# Patient Record
Sex: MALE | Race: OTHER | NOT HISPANIC OR LATINO
[De-identification: names, ages, dates, MRNs, and addresses within clinical notes are randomized per-mention and may not be internally consistent; named-entity substitution may affect disease eponyms.]

---

## 2018-01-05 ENCOUNTER — APPOINTMENT (OUTPATIENT)
Dept: CARDIOLOGY | Facility: CLINIC | Age: 78
End: 2018-01-05

## 2018-01-31 ENCOUNTER — RECORD ABSTRACTING (OUTPATIENT)
Age: 78
End: 2018-01-31

## 2018-01-31 DIAGNOSIS — K21.9 GASTRO-ESOPHAGEAL REFLUX DISEASE W/OUT ESOPHAGITIS: ICD-10-CM

## 2018-01-31 DIAGNOSIS — Z98.890 OTHER SPECIFIED POSTPROCEDURAL STATES: ICD-10-CM

## 2018-01-31 DIAGNOSIS — Z86.73 PERSONAL HISTORY OF TRANSIENT ISCHEMIC ATTACK (TIA), AND CEREBRAL INFARCTION W/OUT RESIDUAL DEFICITS: ICD-10-CM

## 2018-01-31 DIAGNOSIS — Z78.9 OTHER SPECIFIED HEALTH STATUS: ICD-10-CM

## 2018-03-07 ENCOUNTER — APPOINTMENT (OUTPATIENT)
Dept: CARDIOLOGY | Facility: CLINIC | Age: 78
End: 2018-03-07
Payer: MEDICARE

## 2018-03-07 VITALS
SYSTOLIC BLOOD PRESSURE: 142 MMHG | HEART RATE: 64 BPM | RESPIRATION RATE: 14 BRPM | BODY MASS INDEX: 25.77 KG/M2 | WEIGHT: 180 LBS | HEIGHT: 70 IN | DIASTOLIC BLOOD PRESSURE: 70 MMHG

## 2018-03-07 PROCEDURE — 99214 OFFICE O/P EST MOD 30 MIN: CPT

## 2018-03-07 PROCEDURE — 93000 ELECTROCARDIOGRAM COMPLETE: CPT

## 2018-03-07 RX ORDER — RANITIDINE 150 MG/1
150 TABLET ORAL DAILY
Refills: 0 | Status: DISCONTINUED | COMMUNITY
End: 2018-03-07

## 2018-03-09 LAB — INR PPP: 4 RATIO

## 2018-03-20 ENCOUNTER — RESULT CHARGE (OUTPATIENT)
Age: 78
End: 2018-03-20

## 2018-03-23 ENCOUNTER — APPOINTMENT (OUTPATIENT)
Dept: CARDIOLOGY | Facility: CLINIC | Age: 78
End: 2018-03-23
Payer: MEDICARE

## 2018-03-23 VITALS — SYSTOLIC BLOOD PRESSURE: 128 MMHG | RESPIRATION RATE: 16 BRPM | DIASTOLIC BLOOD PRESSURE: 78 MMHG | HEART RATE: 57 BPM

## 2018-03-23 LAB — INR PPP: 2.1 RATIO

## 2018-03-23 PROCEDURE — 85610 PROTHROMBIN TIME: CPT | Mod: QW

## 2018-03-23 PROCEDURE — 99211 OFF/OP EST MAY X REQ PHY/QHP: CPT

## 2018-03-28 ENCOUNTER — APPOINTMENT (OUTPATIENT)
Dept: CARDIOLOGY | Facility: CLINIC | Age: 78
End: 2018-03-28

## 2018-04-04 ENCOUNTER — APPOINTMENT (OUTPATIENT)
Dept: CARDIOLOGY | Facility: CLINIC | Age: 78
End: 2018-04-04

## 2018-04-05 ENCOUNTER — APPOINTMENT (OUTPATIENT)
Dept: CARDIOLOGY | Facility: CLINIC | Age: 78
End: 2018-04-05
Payer: MEDICARE

## 2018-04-05 VITALS — HEIGHT: 70 IN | WEIGHT: 182.5 LBS | BODY MASS INDEX: 26.13 KG/M2

## 2018-04-05 VITALS
SYSTOLIC BLOOD PRESSURE: 162 MMHG | OXYGEN SATURATION: 99 % | DIASTOLIC BLOOD PRESSURE: 78 MMHG | HEART RATE: 92 BPM | RESPIRATION RATE: 16 BRPM

## 2018-04-05 PROCEDURE — 99213 OFFICE O/P EST LOW 20 MIN: CPT

## 2018-04-05 RX ORDER — AMOXICILLIN AND CLAVULANATE POTASSIUM 875; 125 MG/1; MG/1
875-125 TABLET, COATED ORAL
Qty: 20 | Refills: 0 | Status: COMPLETED | COMMUNITY
Start: 2017-10-29

## 2018-04-14 DIAGNOSIS — D64.9 ANEMIA, UNSPECIFIED: ICD-10-CM

## 2018-04-14 DIAGNOSIS — N25.0 RENAL OSTEODYSTROPHY: ICD-10-CM

## 2018-04-23 LAB — INR PPP: 2.9 RATIO

## 2018-05-24 ENCOUNTER — APPOINTMENT (OUTPATIENT)
Dept: CARDIOLOGY | Facility: CLINIC | Age: 78
End: 2018-05-24
Payer: MEDICARE

## 2018-05-24 VITALS
RESPIRATION RATE: 14 BRPM | BODY MASS INDEX: 26.48 KG/M2 | DIASTOLIC BLOOD PRESSURE: 80 MMHG | SYSTOLIC BLOOD PRESSURE: 148 MMHG | HEART RATE: 82 BPM | HEIGHT: 70 IN | WEIGHT: 185 LBS

## 2018-05-24 DIAGNOSIS — E11.9 TYPE 2 DIABETES MELLITUS W/OUT COMPLICATIONS: ICD-10-CM

## 2018-05-24 LAB — INR PPP: 4.6 RATIO

## 2018-05-24 PROCEDURE — 93000 ELECTROCARDIOGRAM COMPLETE: CPT

## 2018-05-24 PROCEDURE — 99214 OFFICE O/P EST MOD 30 MIN: CPT

## 2018-05-24 RX ORDER — GLIPIZIDE 10 MG/1
10 TABLET, EXTENDED RELEASE ORAL
Qty: 180 | Refills: 0 | Status: DISCONTINUED | COMMUNITY
Start: 2018-02-26

## 2018-05-24 RX ORDER — ERGOCALCIFEROL 1.25 MG/1
1.25 MG CAPSULE, LIQUID FILLED ORAL
Qty: 26 | Refills: 0 | Status: DISCONTINUED | COMMUNITY
Start: 2018-02-26

## 2018-05-24 RX ORDER — MAGNESIUM OXIDE 241.3 MG/1000MG
400 TABLET ORAL
Qty: 120 | Refills: 0 | Status: DISCONTINUED | COMMUNITY
Start: 2017-06-21

## 2018-05-24 RX ORDER — POTASSIUM CITRATE 10 MEQ/1
10 MEQ TABLET, EXTENDED RELEASE ORAL
Qty: 270 | Refills: 0 | Status: DISCONTINUED | COMMUNITY
Start: 2017-12-22 | End: 2018-05-24

## 2018-07-09 ENCOUNTER — APPOINTMENT (OUTPATIENT)
Dept: CARDIOLOGY | Facility: CLINIC | Age: 78
End: 2018-07-09
Payer: MEDICARE

## 2018-07-09 VITALS
SYSTOLIC BLOOD PRESSURE: 136 MMHG | RESPIRATION RATE: 14 BRPM | HEART RATE: 89 BPM | DIASTOLIC BLOOD PRESSURE: 80 MMHG | HEIGHT: 70 IN | BODY MASS INDEX: 26.34 KG/M2 | WEIGHT: 184 LBS

## 2018-07-09 PROCEDURE — 93000 ELECTROCARDIOGRAM COMPLETE: CPT

## 2018-07-09 PROCEDURE — 85610 PROTHROMBIN TIME: CPT | Mod: QW

## 2018-07-09 PROCEDURE — 99215 OFFICE O/P EST HI 40 MIN: CPT

## 2018-07-09 RX ORDER — PIOGLITAZONE HYDROCHLORIDE 15 MG/1
15 TABLET ORAL
Qty: 30 | Refills: 0 | Status: DISCONTINUED | COMMUNITY
Start: 2018-04-24 | End: 2018-07-09

## 2018-07-09 RX ORDER — MOMETASONE 50 UG/1
50 SPRAY, METERED NASAL
Qty: 17 | Refills: 0 | Status: DISCONTINUED | COMMUNITY
Start: 2017-10-29 | End: 2018-07-09

## 2018-07-09 RX ORDER — POTASSIUM CHLORIDE 750 MG/1
10 TABLET, FILM COATED, EXTENDED RELEASE ORAL DAILY
Refills: 0 | Status: DISCONTINUED | COMMUNITY
Start: 2018-06-29 | End: 2018-07-09

## 2018-07-11 ENCOUNTER — RESULT CHARGE (OUTPATIENT)
Age: 78
End: 2018-07-11

## 2018-07-12 ENCOUNTER — APPOINTMENT (OUTPATIENT)
Dept: CARDIOLOGY | Facility: CLINIC | Age: 78
End: 2018-07-12
Payer: MEDICARE

## 2018-07-12 VITALS — DIASTOLIC BLOOD PRESSURE: 80 MMHG | SYSTOLIC BLOOD PRESSURE: 130 MMHG | RESPIRATION RATE: 12 BRPM | HEART RATE: 82 BPM

## 2018-07-12 PROCEDURE — 85610 PROTHROMBIN TIME: CPT | Mod: QW

## 2018-07-12 PROCEDURE — 99211 OFF/OP EST MAY X REQ PHY/QHP: CPT

## 2018-07-13 ENCOUNTER — RESULT REVIEW (OUTPATIENT)
Age: 78
End: 2018-07-13

## 2018-07-13 LAB
INR PPP: 3.6 RATIO
INR PPP: 6.9 RATIO

## 2018-07-16 ENCOUNTER — APPOINTMENT (OUTPATIENT)
Dept: CARDIOLOGY | Facility: CLINIC | Age: 78
End: 2018-07-16
Payer: MEDICARE

## 2018-07-16 VITALS — HEART RATE: 80 BPM | RESPIRATION RATE: 14 BRPM | DIASTOLIC BLOOD PRESSURE: 80 MMHG | SYSTOLIC BLOOD PRESSURE: 142 MMHG

## 2018-07-16 PROCEDURE — 99211 OFF/OP EST MAY X REQ PHY/QHP: CPT

## 2018-07-16 PROCEDURE — 85610 PROTHROMBIN TIME: CPT | Mod: QW

## 2018-07-24 LAB — INR PPP: 3.3 RATIO

## 2018-07-25 ENCOUNTER — APPOINTMENT (OUTPATIENT)
Dept: CARDIOLOGY | Facility: CLINIC | Age: 78
End: 2018-07-25
Payer: MEDICARE

## 2018-07-25 VITALS — SYSTOLIC BLOOD PRESSURE: 118 MMHG | RESPIRATION RATE: 16 BRPM | HEART RATE: 76 BPM | DIASTOLIC BLOOD PRESSURE: 70 MMHG

## 2018-07-25 PROCEDURE — 85610 PROTHROMBIN TIME: CPT | Mod: QW

## 2018-07-25 PROCEDURE — 99211 OFF/OP EST MAY X REQ PHY/QHP: CPT

## 2018-07-26 LAB — INR PPP: 2.8 RATIO

## 2018-08-08 ENCOUNTER — APPOINTMENT (OUTPATIENT)
Dept: CARDIOLOGY | Facility: CLINIC | Age: 78
End: 2018-08-08
Payer: MEDICARE

## 2018-08-08 VITALS
OXYGEN SATURATION: 98 % | HEART RATE: 70 BPM | BODY MASS INDEX: 25.05 KG/M2 | WEIGHT: 175 LBS | HEIGHT: 70 IN | RESPIRATION RATE: 16 BRPM

## 2018-08-08 PROCEDURE — 85610 PROTHROMBIN TIME: CPT | Mod: QW

## 2018-08-08 PROCEDURE — 99211 OFF/OP EST MAY X REQ PHY/QHP: CPT

## 2018-08-09 LAB — INR PPP: 2.5 RATIO

## 2018-08-16 ENCOUNTER — APPOINTMENT (OUTPATIENT)
Dept: CARDIOLOGY | Facility: CLINIC | Age: 78
End: 2018-08-16
Payer: MEDICARE

## 2018-08-16 VITALS
BODY MASS INDEX: 24.62 KG/M2 | HEART RATE: 74 BPM | WEIGHT: 172 LBS | DIASTOLIC BLOOD PRESSURE: 76 MMHG | SYSTOLIC BLOOD PRESSURE: 158 MMHG | RESPIRATION RATE: 16 BRPM | HEIGHT: 70 IN

## 2018-08-16 PROCEDURE — 93000 ELECTROCARDIOGRAM COMPLETE: CPT

## 2018-08-16 PROCEDURE — 99214 OFFICE O/P EST MOD 30 MIN: CPT

## 2018-08-16 RX ORDER — EMPAGLIFLOZIN 10 MG/1
10 TABLET, FILM COATED ORAL
Qty: 90 | Refills: 0 | Status: DISCONTINUED | COMMUNITY
Start: 2017-09-28 | End: 2018-08-16

## 2018-09-05 ENCOUNTER — CHART COPY (OUTPATIENT)
Age: 78
End: 2018-09-05

## 2018-09-05 ENCOUNTER — APPOINTMENT (OUTPATIENT)
Dept: CARDIOLOGY | Facility: CLINIC | Age: 78
End: 2018-09-05
Payer: MEDICARE

## 2018-09-05 VITALS
DIASTOLIC BLOOD PRESSURE: 68 MMHG | OXYGEN SATURATION: 97 % | RESPIRATION RATE: 16 BRPM | HEART RATE: 73 BPM | SYSTOLIC BLOOD PRESSURE: 138 MMHG

## 2018-09-05 PROCEDURE — 85610 PROTHROMBIN TIME: CPT | Mod: QW

## 2018-09-05 PROCEDURE — 99211 OFF/OP EST MAY X REQ PHY/QHP: CPT

## 2018-09-10 LAB — INR PPP: 2.2 RATIO

## 2018-10-03 ENCOUNTER — APPOINTMENT (OUTPATIENT)
Dept: CARDIOLOGY | Facility: CLINIC | Age: 78
End: 2018-10-03
Payer: MEDICARE

## 2018-10-03 VITALS — RESPIRATION RATE: 14 BRPM | SYSTOLIC BLOOD PRESSURE: 130 MMHG | DIASTOLIC BLOOD PRESSURE: 80 MMHG | HEART RATE: 72 BPM

## 2018-10-03 PROCEDURE — 85610 PROTHROMBIN TIME: CPT | Mod: QW

## 2018-10-03 PROCEDURE — 99211 OFF/OP EST MAY X REQ PHY/QHP: CPT | Mod: 25

## 2018-10-17 ENCOUNTER — APPOINTMENT (OUTPATIENT)
Dept: CARDIOLOGY | Facility: CLINIC | Age: 78
End: 2018-10-17
Payer: MEDICARE

## 2018-10-17 VITALS — RESPIRATION RATE: 16 BRPM | SYSTOLIC BLOOD PRESSURE: 167 MMHG | DIASTOLIC BLOOD PRESSURE: 83 MMHG | HEART RATE: 86 BPM

## 2018-10-17 PROCEDURE — 85610 PROTHROMBIN TIME: CPT | Mod: QW

## 2018-10-17 PROCEDURE — 99211 OFF/OP EST MAY X REQ PHY/QHP: CPT

## 2018-10-19 LAB
INR PPP: 1.6 RATIO
INR PPP: 2.9 RATIO

## 2018-10-31 ENCOUNTER — APPOINTMENT (OUTPATIENT)
Dept: CARDIOLOGY | Facility: CLINIC | Age: 78
End: 2018-10-31
Payer: MEDICARE

## 2018-10-31 VITALS
HEART RATE: 82 BPM | WEIGHT: 172 LBS | HEIGHT: 70 IN | BODY MASS INDEX: 24.62 KG/M2 | OXYGEN SATURATION: 94 % | RESPIRATION RATE: 16 BRPM

## 2018-10-31 LAB — INR PPP: 2.1 RATIO

## 2018-10-31 PROCEDURE — 99211 OFF/OP EST MAY X REQ PHY/QHP: CPT | Mod: 25

## 2018-10-31 PROCEDURE — 85610 PROTHROMBIN TIME: CPT | Mod: QW

## 2018-11-08 ENCOUNTER — APPOINTMENT (OUTPATIENT)
Dept: CARDIOLOGY | Facility: CLINIC | Age: 78
End: 2018-11-08
Payer: MEDICARE

## 2018-11-08 VITALS
SYSTOLIC BLOOD PRESSURE: 142 MMHG | HEART RATE: 71 BPM | WEIGHT: 169 LBS | BODY MASS INDEX: 24.2 KG/M2 | DIASTOLIC BLOOD PRESSURE: 78 MMHG | HEIGHT: 70 IN | RESPIRATION RATE: 14 BRPM

## 2018-11-08 PROCEDURE — 93000 ELECTROCARDIOGRAM COMPLETE: CPT

## 2018-11-08 PROCEDURE — 99214 OFFICE O/P EST MOD 30 MIN: CPT

## 2018-11-08 RX ORDER — COLCHICINE 0.6 MG/1
0.6 TABLET ORAL DAILY
Refills: 0 | Status: DISCONTINUED | COMMUNITY
Start: 2018-02-18 | End: 2018-11-08

## 2018-11-08 NOTE — HISTORY OF PRESENT ILLNESS
[FreeTextEntry1] : Mr. Fischer presents today without complaints of exertional chest pain, shortness of breath, palpitations, lightheadedness, or syncope.  He remains reasonably active.  \par \par \par

## 2018-11-08 NOTE — REASON FOR VISIT
[FreeTextEntry1] : Mr. Fischer Patient presents today for follow up evaluation of his underlying hypertension, hyperlipidemia, non-obstructive coronary artery disease, and atrial fibrillation.  \par   \par

## 2018-11-08 NOTE — PHYSICAL EXAM
[General Appearance - Well Developed] : well developed [General Appearance - Well Nourished] : well nourished [General Appearance - In No Acute Distress] : no acute distress [Normal Conjunctiva] : the conjunctiva exhibited no abnormalities [Normal Oral Mucosa] : normal oral mucosa [Auscultation Breath Sounds / Voice Sounds] : lungs were clear to auscultation bilaterally [Heart Sounds] : normal S1 and S2 [Irregularly Irregular] : the rhythm was irregularly irregular [Systolic grade ___/6] : A grade [unfilled]/6 systolic murmur was heard. [Bowel Sounds] : normal bowel sounds [Abnormal Walk] : normal gait [Skin Color & Pigmentation] : normal skin color and pigmentation [Skin Turgor] : normal skin turgor [Oriented To Time, Place, And Person] : oriented to person, place, and time [Affect] : the affect was normal [Mood] : the mood was normal [FreeTextEntry1] : Chronic venous stasis, trace edema.

## 2018-11-08 NOTE — ASSESSMENT
[FreeTextEntry1] : 1.  EKG today reveals atrial fibrillation with a controlled ventricular response.  No ischemic changes. \par 2.  Hypertension:  Blood pressure borderline controlled at this time.  Patient is advised on a strict low-salt diet and weight loss. \par 3.  Hyperlipidemia:  Patient advised to follow a low-fat / low-cholesterol diet.  \par 4.  Non-obstructive coronary artery disease:  Patient clinically stable denying chest pain or shortness of breath.  \par 5.  Atrial fibrillation:  Patient currently in a-fib with a controlled ventricular response.  Tolerating Coumadin therapy well.  If clinically stable, follow up office visit three months. \par

## 2018-11-28 ENCOUNTER — APPOINTMENT (OUTPATIENT)
Dept: CARDIOLOGY | Facility: CLINIC | Age: 78
End: 2018-11-28

## 2019-01-18 ENCOUNTER — APPOINTMENT (OUTPATIENT)
Dept: CARDIOLOGY | Facility: CLINIC | Age: 79
End: 2019-01-18
Payer: MEDICARE

## 2019-01-18 PROCEDURE — 93306 TTE W/DOPPLER COMPLETE: CPT

## 2019-02-18 ENCOUNTER — APPOINTMENT (OUTPATIENT)
Dept: CARDIOLOGY | Facility: CLINIC | Age: 79
End: 2019-02-18
Payer: MEDICARE

## 2019-02-18 ENCOUNTER — NON-APPOINTMENT (OUTPATIENT)
Age: 79
End: 2019-02-18

## 2019-02-18 VITALS
RESPIRATION RATE: 14 BRPM | BODY MASS INDEX: 25.77 KG/M2 | SYSTOLIC BLOOD PRESSURE: 128 MMHG | HEART RATE: 84 BPM | DIASTOLIC BLOOD PRESSURE: 80 MMHG | HEIGHT: 70 IN | WEIGHT: 180 LBS

## 2019-02-18 PROCEDURE — 99214 OFFICE O/P EST MOD 30 MIN: CPT

## 2019-02-18 PROCEDURE — 93000 ELECTROCARDIOGRAM COMPLETE: CPT

## 2019-02-19 NOTE — REASON FOR VISIT
[FreeTextEntry1] : Mr. Fischer presents today for the evaluation and management of hypertension, hyperlipidemia, non-obstructive coronary artery disease and chronic atrial fibrillation.

## 2019-02-19 NOTE — HISTORY OF PRESENT ILLNESS
[FreeTextEntry1] : Patient presents today without complaints of exertional chest pain, shortness of breath, palpitations, lightheadedness, or syncope.  He is in considerable pain involving an arthritic left knee.  Patient was recently taken off Meloxicam because of “kidney function concerns.”

## 2019-02-19 NOTE — ASSESSMENT
[FreeTextEntry1] : 1.  EKG today demonstrates atrial fibrillation with a controlled ventricular response.  No acute ischemic changes. \par 2.  Chronic atrial fibrillation:  Patient remains in a-fib with a controlled ventricular response.  INR today 5.0.  Patient advised to hold Coumadin for the next three days and then resume Coumadin at 6 mg daily.  PT/INR in 10-14 days.  \par 3.  Hypertension:  Blood pressure appears well controlled at this time.  Will discontinue Spironolactone in an attempt to improve renal function.  Patient will undergo bloodwork prior to his follow up.  \par 4.  Hyperlipidemia:  Patient tolerating Lipitor therapy.  Will undergo fasting lipid profile prior to his next office visit.  \par

## 2019-03-15 ENCOUNTER — APPOINTMENT (OUTPATIENT)
Dept: CARDIOLOGY | Facility: CLINIC | Age: 79
End: 2019-03-15
Payer: MEDICARE

## 2019-03-15 ENCOUNTER — NON-APPOINTMENT (OUTPATIENT)
Age: 79
End: 2019-03-15

## 2019-03-15 VITALS
SYSTOLIC BLOOD PRESSURE: 114 MMHG | HEART RATE: 72 BPM | RESPIRATION RATE: 14 BRPM | WEIGHT: 195 LBS | BODY MASS INDEX: 27.92 KG/M2 | DIASTOLIC BLOOD PRESSURE: 80 MMHG | HEIGHT: 70 IN

## 2019-03-15 DIAGNOSIS — N18.3 CHRONIC KIDNEY DISEASE, STAGE 3 (MODERATE): ICD-10-CM

## 2019-03-15 PROCEDURE — 93000 ELECTROCARDIOGRAM COMPLETE: CPT

## 2019-03-15 PROCEDURE — 99214 OFFICE O/P EST MOD 30 MIN: CPT

## 2019-03-15 RX ORDER — PIOGLITAZONE HYDROCHLORIDE 15 MG/1
15 TABLET ORAL DAILY
Refills: 0 | Status: DISCONTINUED | COMMUNITY
End: 2019-03-15

## 2019-03-15 RX ORDER — ESOMEPRAZOLE MAGNESIUM 40 MG/1
40 CAPSULE, DELAYED RELEASE ORAL DAILY
Refills: 0 | Status: DISCONTINUED | COMMUNITY
End: 2019-03-15

## 2019-03-15 RX ORDER — MELOXICAM 7.5 MG/1
7.5 TABLET ORAL DAILY
Refills: 0 | Status: DISCONTINUED | COMMUNITY
End: 2019-03-15

## 2019-03-15 RX ORDER — AMLODIPINE BESYLATE 5 MG/1
5 TABLET ORAL DAILY
Qty: 90 | Refills: 3 | Status: ACTIVE | COMMUNITY
Start: 2019-03-15 | End: 1900-01-01

## 2019-03-15 NOTE — REVIEW OF SYSTEMS
[see HPI] : see HPI [Negative] : Heme/Lymph [Recent Weight Gain (___ Lbs)] : recent [unfilled] ~Ulb weight gain [Lower Ext Edema] : lower extremity edema

## 2019-03-19 ENCOUNTER — RX RENEWAL (OUTPATIENT)
Age: 79
End: 2019-03-19

## 2019-03-19 ENCOUNTER — MEDICATION RENEWAL (OUTPATIENT)
Age: 79
End: 2019-03-19

## 2019-03-27 ENCOUNTER — APPOINTMENT (OUTPATIENT)
Dept: CARDIOLOGY | Facility: CLINIC | Age: 79
End: 2019-03-27
Payer: MEDICARE

## 2019-03-27 VITALS — RESPIRATION RATE: 16 BRPM | SYSTOLIC BLOOD PRESSURE: 155 MMHG | HEART RATE: 77 BPM | DIASTOLIC BLOOD PRESSURE: 77 MMHG

## 2019-03-27 PROCEDURE — 93793 ANTICOAG MGMT PT WARFARIN: CPT

## 2019-03-28 LAB — INR PPP: 3.3 RATIO

## 2019-04-08 NOTE — DISCUSSION/SUMMARY
[FreeTextEntry1] : Case and plan discussed with Dr. Medina.\par \par 1 - Hypertension: Blood pressure well controlled on current medications.  Advised to follow strict low salt diet and weight loss.\par \par 2 - Chronic atrial fibrillation: today's EKG reveals atrial fibrillation with a controlled ventricular response.  Denies shortness of breath or palpitations.  INR from 3/14/19 was 3.3.  Advised Mr. Fischer to hold his coumadin dose today and resume his 6mg daily tomorrow.  INR check in 2 weeks.\par \par 3 - Peripheral edema:  patient with increased edema.  Up 15 pounds from his last visit 1 month ago.  He is very confused as to the medications he should be taking.  His daughter will confirm once she gets home.  At this time we will have him continue furosemide 40mg daily.  Will not increase as his BUN and creatinine are elevated.  BUN 61, creatinine 2.5. potassium 4.5 (not on K+ supplementation).  Have advised Mr. Fischer to return to Dr. Wilks for assessment of his kidney function, who he says he has not seen in a very long time.   Diovan will be discontinued.  Will start amlodipine 5mg daily.\par \par 4 -Hyperlipidemia:  low fat, low cholesterol, low carbohydrated diet.\par \par 5 - Labs prior to follow up visit.\par \par 6 - Follow up in 4-6 weeks.

## 2019-04-08 NOTE — HISTORY OF PRESENT ILLNESS
[FreeTextEntry1] : Mr. Fischer presents today for follow up evaluation.  Presently feeling well.  Denies complaints of chest pain, shortness of breath, palpitations, lightheadednes or syncope.  States he is being compliant with his low salt diet.  His daughter accompanied him today and says that she does all the cooking for him.  He appears to be very confused with his medication list.  The daughter will check his meds when they get home and will contact me to clarify.  He will be needing knee replacement surgery in the near future.

## 2019-04-08 NOTE — PHYSICAL EXAM
[General Appearance - Well Developed] : well developed [General Appearance - Well Nourished] : well nourished [General Appearance - In No Acute Distress] : no acute distress [Normal Conjunctiva] : the conjunctiva exhibited no abnormalities [Normal Oral Mucosa] : normal oral mucosa [] : no respiratory distress [Auscultation Breath Sounds / Voice Sounds] : lungs were clear to auscultation bilaterally [Heart Sounds] : normal S1 and S2 [Bowel Sounds] : normal bowel sounds [Abnormal Walk] : normal gait [Skin Color & Pigmentation] : normal skin color and pigmentation [Skin Turgor] : normal skin turgor [Oriented To Time, Place, And Person] : oriented to person, place, and time [Affect] : the affect was normal [Mood] : the mood was normal [FreeTextEntry1] : 1-2+ bilateral LE edema

## 2019-04-08 NOTE — REASON FOR VISIT
[FreeTextEntry1] : The patient is a 78 y.o. white male who presents today for the evaluation and management of hypertension, hyperlipidemia, non-obstructive coronary artery disease and chronic atrial fibrillation.

## 2019-04-10 ENCOUNTER — APPOINTMENT (OUTPATIENT)
Dept: CARDIOLOGY | Facility: CLINIC | Age: 79
End: 2019-04-10

## 2019-04-10 LAB — INR PPP: 3.3

## 2019-04-11 ENCOUNTER — NON-APPOINTMENT (OUTPATIENT)
Age: 79
End: 2019-04-11

## 2019-04-11 ENCOUNTER — APPOINTMENT (OUTPATIENT)
Dept: CARDIOLOGY | Facility: CLINIC | Age: 79
End: 2019-04-11
Payer: MEDICARE

## 2019-04-11 VITALS
HEIGHT: 70 IN | DIASTOLIC BLOOD PRESSURE: 79 MMHG | WEIGHT: 188 LBS | BODY MASS INDEX: 26.92 KG/M2 | HEART RATE: 105 BPM | RESPIRATION RATE: 14 BRPM | SYSTOLIC BLOOD PRESSURE: 139 MMHG

## 2019-04-11 DIAGNOSIS — I25.10 ATHEROSCLEROTIC HEART DISEASE OF NATIVE CORONARY ARTERY W/OUT ANGINA PECTORIS: ICD-10-CM

## 2019-04-11 DIAGNOSIS — E78.5 HYPERLIPIDEMIA, UNSPECIFIED: ICD-10-CM

## 2019-04-11 DIAGNOSIS — R60.9 EDEMA, UNSPECIFIED: ICD-10-CM

## 2019-04-11 DIAGNOSIS — I48.0 PAROXYSMAL ATRIAL FIBRILLATION: ICD-10-CM

## 2019-04-11 DIAGNOSIS — I10 ESSENTIAL (PRIMARY) HYPERTENSION: ICD-10-CM

## 2019-04-11 PROCEDURE — 99214 OFFICE O/P EST MOD 30 MIN: CPT

## 2019-04-11 PROCEDURE — 93000 ELECTROCARDIOGRAM COMPLETE: CPT

## 2019-04-11 NOTE — DISCUSSION/SUMMARY
[FreeTextEntry1] : Dr. Medina in to speak with and assess the patient.\par \par 1 - Hypertension:  blood pressure well controlled on current medications.  Advised to follow low salt diet.\par \par 2 - Chronic atrial fibrillation:  today's EKG reveals atrial fibrillation with a rapid ventricular response.  Denies shortness of breath or palpitaitons.  Next INR due in 1 week.\par \par 3 - Peripheral edema:  patient with "throbbing" pain in left knee.  Unable to put pressure on leg for walking.  Advised patient and daughter to go to [facility] ER for evaluation, and to contact his orthopedist.\par \par 4 - Labs:  BUN 53, creatinine 2.7.\par \par 5 - Patient has follow up on Monday.

## 2019-04-11 NOTE — PHYSICAL EXAM
[General Appearance - Well Developed] : well developed [General Appearance - Well Nourished] : well nourished [General Appearance - In No Acute Distress] : no acute distress [Normal Conjunctiva] : the conjunctiva exhibited no abnormalities [Normal Oral Mucosa] : normal oral mucosa [] : no respiratory distress [Auscultation Breath Sounds / Voice Sounds] : lungs were clear to auscultation bilaterally [Heart Sounds] : normal S1 and S2 [Bowel Sounds] : normal bowel sounds [FreeTextEntry1] : 1-2+ bilateral ankle/feet edema.  Swollen and warm left knee. [Skin Color & Pigmentation] : normal skin color and pigmentation [Skin Turgor] : normal skin turgor [Oriented To Time, Place, And Person] : oriented to person, place, and time [Affect] : the affect was normal [Mood] : the mood was normal

## 2019-04-11 NOTE — REASON FOR VISIT
[FreeTextEntry1] : The patient is a 79y.o. white male who presents today for the evaluation and management of hypertension, hyperlipidemia, non-obstructive coronary artery disease and chronic atrial fibrillation.

## 2019-04-11 NOTE — DISCUSSION/SUMMARY
[FreeTextEntry1] : Dr. Medina in to speak with and assess the patient.\par \par 1 - Hypertension:  blood pressure well controlled on current medications.  Advised to follow low salt diet.\par \par 2 - Chronic atrial fibrillation:  today's EKG reveals atrial fibrillation with a rapid ventricular response.  Denies shortness of breath or palpitaitons.  Next INR due in 1 week.\par \par 3 - Peripheral edema:  patient with "throbbing" pain in left knee.  Unable to put pressure on leg for walking.  Advised patient and daughter to go to [facility] ER for evaluation, and to contact his orthopedist.\par \par 4 - Labs:  BUN 53, creatinine 2.7.\par \par 5 - Patient has follow up on Monday.

## 2019-04-11 NOTE — HISTORY OF PRESENT ILLNESS
[FreeTextEntry1] : Mr. Fischer presents today for follow up evaluation.  Presently he is without complaints of chest pain, shortness of breath, palpitations, lightheadedness or syncope.  His major complaint today is that of his left leg, mainly his knee and ankle/foot pain and swelling.  He is unable to walk on it as the pain is throbbing.

## 2019-04-15 ENCOUNTER — APPOINTMENT (OUTPATIENT)
Dept: CARDIOLOGY | Facility: CLINIC | Age: 79
End: 2019-04-15

## 2019-05-07 ENCOUNTER — RESULT REVIEW (OUTPATIENT)
Age: 79
End: 2019-05-07

## 2019-05-07 LAB — INR PPP: 6.3

## 2019-06-12 ENCOUNTER — INPATIENT (INPATIENT)
Facility: HOSPITAL | Age: 79
LOS: 7 days | Discharge: NURSING, REHAB OR LONG-TERM CARE | DRG: 871 | End: 2019-06-20
Attending: HOSPITALIST | Admitting: HOSPITALIST
Payer: MEDICARE

## 2019-06-12 VITALS
HEART RATE: 86 BPM | SYSTOLIC BLOOD PRESSURE: 170 MMHG | WEIGHT: 179.9 LBS | DIASTOLIC BLOOD PRESSURE: 79 MMHG | RESPIRATION RATE: 28 BRPM | TEMPERATURE: 101 F | OXYGEN SATURATION: 99 % | HEIGHT: 71 IN

## 2019-06-12 DIAGNOSIS — M1A.20X0 DRUG-INDUCED CHRONIC GOUT, UNSPECIFIED SITE, WITHOUT TOPHUS (TOPHI): ICD-10-CM

## 2019-06-12 DIAGNOSIS — N18.3 CHRONIC KIDNEY DISEASE, STAGE 3 (MODERATE): ICD-10-CM

## 2019-06-12 DIAGNOSIS — E11.8 TYPE 2 DIABETES MELLITUS WITH UNSPECIFIED COMPLICATIONS: ICD-10-CM

## 2019-06-12 DIAGNOSIS — I48.0 PAROXYSMAL ATRIAL FIBRILLATION: ICD-10-CM

## 2019-06-12 DIAGNOSIS — I50.9 HEART FAILURE, UNSPECIFIED: ICD-10-CM

## 2019-06-12 DIAGNOSIS — I10 ESSENTIAL (PRIMARY) HYPERTENSION: ICD-10-CM

## 2019-06-12 DIAGNOSIS — J18.9 PNEUMONIA, UNSPECIFIED ORGANISM: ICD-10-CM

## 2019-06-12 DIAGNOSIS — A41.9 SEPSIS, UNSPECIFIED ORGANISM: ICD-10-CM

## 2019-06-12 LAB
ALBUMIN SERPL ELPH-MCNC: 2.9 G/DL — LOW (ref 3.3–5.2)
ALP SERPL-CCNC: 132 U/L — HIGH (ref 40–120)
ALT FLD-CCNC: 33 U/L — SIGNIFICANT CHANGE UP
ANION GAP SERPL CALC-SCNC: 13 MMOL/L — SIGNIFICANT CHANGE UP (ref 5–17)
ANISOCYTOSIS BLD QL: SLIGHT — SIGNIFICANT CHANGE UP
APPEARANCE UR: CLEAR — SIGNIFICANT CHANGE UP
APTT BLD: 39.5 SEC — HIGH (ref 27.5–36.3)
AST SERPL-CCNC: 32 U/L — SIGNIFICANT CHANGE UP
BACTERIA # UR AUTO: NEGATIVE — SIGNIFICANT CHANGE UP
BASOPHILS # BLD AUTO: 0 K/UL — SIGNIFICANT CHANGE UP (ref 0–0.2)
BILIRUB SERPL-MCNC: 0.5 MG/DL — SIGNIFICANT CHANGE UP (ref 0.4–2)
BILIRUB UR-MCNC: NEGATIVE — SIGNIFICANT CHANGE UP
BUN SERPL-MCNC: 42 MG/DL — HIGH (ref 8–20)
CALCIUM SERPL-MCNC: 9.4 MG/DL — SIGNIFICANT CHANGE UP (ref 8.6–10.2)
CHLORIDE SERPL-SCNC: 100 MMOL/L — SIGNIFICANT CHANGE UP (ref 98–107)
CO2 SERPL-SCNC: 23 MMOL/L — SIGNIFICANT CHANGE UP (ref 22–29)
COLOR SPEC: YELLOW — SIGNIFICANT CHANGE UP
CREAT SERPL-MCNC: 1.98 MG/DL — HIGH (ref 0.5–1.3)
DIFF PNL FLD: ABNORMAL
ENTEROCOC DNA BLD POS QL NAA+NON-PROBE: SIGNIFICANT CHANGE UP
EOSINOPHIL # BLD AUTO: 0 K/UL — SIGNIFICANT CHANGE UP (ref 0–0.5)
EPI CELLS # UR: SIGNIFICANT CHANGE UP
GLUCOSE BLDC GLUCOMTR-MCNC: 103 MG/DL — HIGH (ref 70–99)
GLUCOSE BLDC GLUCOMTR-MCNC: 193 MG/DL — HIGH (ref 70–99)
GLUCOSE SERPL-MCNC: 187 MG/DL — HIGH (ref 70–115)
GLUCOSE UR QL: NEGATIVE MG/DL — SIGNIFICANT CHANGE UP
HCT VFR BLD CALC: 36.7 % — LOW (ref 42–52)
HGB BLD-MCNC: 11.9 G/DL — LOW (ref 14–18)
INR BLD: 2.63 RATIO — HIGH (ref 0.88–1.16)
KETONES UR-MCNC: NEGATIVE — SIGNIFICANT CHANGE UP
LACTATE BLDV-MCNC: 2.2 MMOL/L — HIGH (ref 0.5–2)
LACTATE BLDV-MCNC: 2.3 MMOL/L — HIGH (ref 0.5–2)
LEUKOCYTE ESTERASE UR-ACNC: NEGATIVE — SIGNIFICANT CHANGE UP
LYMPHOCYTES # BLD AUTO: 0.8 K/UL — LOW (ref 1–4.8)
LYMPHOCYTES # BLD AUTO: 6 % — LOW (ref 20–55)
MACROCYTES BLD QL: SLIGHT — SIGNIFICANT CHANGE UP
MCHC RBC-ENTMCNC: 28.8 PG — SIGNIFICANT CHANGE UP (ref 27–31)
MCHC RBC-ENTMCNC: 32.4 G/DL — SIGNIFICANT CHANGE UP (ref 32–36)
MCV RBC AUTO: 88.9 FL — SIGNIFICANT CHANGE UP (ref 80–94)
METHOD TYPE: SIGNIFICANT CHANGE UP
MICROCYTES BLD QL: SLIGHT — SIGNIFICANT CHANGE UP
MONOCYTES # BLD AUTO: 1.4 K/UL — HIGH (ref 0–0.8)
MONOCYTES NFR BLD AUTO: 11 % — HIGH (ref 3–10)
NEUTROPHILS # BLD AUTO: 10.3 K/UL — HIGH (ref 1.8–8)
NEUTROPHILS NFR BLD AUTO: 81 % — HIGH (ref 37–73)
NEUTS BAND # BLD: 2 % — SIGNIFICANT CHANGE UP (ref 0–8)
NITRITE UR-MCNC: NEGATIVE — SIGNIFICANT CHANGE UP
PH UR: 6 — SIGNIFICANT CHANGE UP (ref 5–8)
PLAT MORPH BLD: NORMAL — SIGNIFICANT CHANGE UP
PLATELET # BLD AUTO: 187 K/UL — SIGNIFICANT CHANGE UP (ref 150–400)
POIKILOCYTOSIS BLD QL AUTO: SLIGHT — SIGNIFICANT CHANGE UP
POTASSIUM SERPL-MCNC: 4.5 MMOL/L — SIGNIFICANT CHANGE UP (ref 3.5–5.3)
POTASSIUM SERPL-SCNC: 4.5 MMOL/L — SIGNIFICANT CHANGE UP (ref 3.5–5.3)
PROT SERPL-MCNC: 7.1 G/DL — SIGNIFICANT CHANGE UP (ref 6.6–8.7)
PROT UR-MCNC: 30 MG/DL
PROTHROM AB SERPL-ACNC: 31.2 SEC — HIGH (ref 10–12.9)
RBC # BLD: 4.13 M/UL — LOW (ref 4.6–6.2)
RBC # FLD: 16.5 % — HIGH (ref 11–15.6)
RBC BLD AUTO: ABNORMAL
RBC CASTS # UR COMP ASSIST: SIGNIFICANT CHANGE UP /HPF (ref 0–4)
SODIUM SERPL-SCNC: 136 MMOL/L — SIGNIFICANT CHANGE UP (ref 135–145)
SP GR SPEC: 1.01 — SIGNIFICANT CHANGE UP (ref 1.01–1.02)
URATE SERPL-MCNC: 5.8 MG/DL — SIGNIFICANT CHANGE UP (ref 3.4–7)
UROBILINOGEN FLD QL: NEGATIVE MG/DL — SIGNIFICANT CHANGE UP
WBC # BLD: 12.6 K/UL — HIGH (ref 4.8–10.8)
WBC # FLD AUTO: 12.6 K/UL — HIGH (ref 4.8–10.8)

## 2019-06-12 PROCEDURE — 71250 CT THORAX DX C-: CPT | Mod: 26

## 2019-06-12 PROCEDURE — 71045 X-RAY EXAM CHEST 1 VIEW: CPT | Mod: 26

## 2019-06-12 PROCEDURE — 99223 1ST HOSP IP/OBS HIGH 75: CPT

## 2019-06-12 PROCEDURE — 99291 CRITICAL CARE FIRST HOUR: CPT

## 2019-06-12 PROCEDURE — 93010 ELECTROCARDIOGRAM REPORT: CPT

## 2019-06-12 RX ORDER — METOPROLOL TARTRATE 50 MG
200 TABLET ORAL DAILY
Refills: 0 | Status: DISCONTINUED | OUTPATIENT
Start: 2019-06-12 | End: 2019-06-20

## 2019-06-12 RX ORDER — ACETAMINOPHEN 500 MG
975 TABLET ORAL ONCE
Refills: 0 | Status: COMPLETED | OUTPATIENT
Start: 2019-06-12 | End: 2019-06-12

## 2019-06-12 RX ORDER — SODIUM CHLORIDE 9 MG/ML
1000 INJECTION, SOLUTION INTRAVENOUS
Refills: 0 | Status: DISCONTINUED | OUTPATIENT
Start: 2019-06-12 | End: 2019-06-20

## 2019-06-12 RX ORDER — PANTOPRAZOLE SODIUM 20 MG/1
40 TABLET, DELAYED RELEASE ORAL
Refills: 0 | Status: DISCONTINUED | OUTPATIENT
Start: 2019-06-12 | End: 2019-06-20

## 2019-06-12 RX ORDER — ASPIRIN/CALCIUM CARB/MAGNESIUM 324 MG
81 TABLET ORAL DAILY
Refills: 0 | Status: DISCONTINUED | OUTPATIENT
Start: 2019-06-12 | End: 2019-06-20

## 2019-06-12 RX ORDER — DEXTROSE 50 % IN WATER 50 %
25 SYRINGE (ML) INTRAVENOUS ONCE
Refills: 0 | Status: DISCONTINUED | OUTPATIENT
Start: 2019-06-12 | End: 2019-06-20

## 2019-06-12 RX ORDER — PIPERACILLIN AND TAZOBACTAM 4; .5 G/20ML; G/20ML
3.38 INJECTION, POWDER, LYOPHILIZED, FOR SOLUTION INTRAVENOUS ONCE
Refills: 0 | Status: COMPLETED | OUTPATIENT
Start: 2019-06-12 | End: 2019-06-12

## 2019-06-12 RX ORDER — VANCOMYCIN HCL 1 G
1000 VIAL (EA) INTRAVENOUS ONCE
Refills: 0 | Status: COMPLETED | OUTPATIENT
Start: 2019-06-12 | End: 2019-06-12

## 2019-06-12 RX ORDER — METOPROLOL TARTRATE 50 MG
1 TABLET ORAL
Qty: 0 | Refills: 0 | DISCHARGE

## 2019-06-12 RX ORDER — SITAGLIPTIN 50 MG/1
1 TABLET, FILM COATED ORAL
Qty: 0 | Refills: 0 | DISCHARGE

## 2019-06-12 RX ORDER — WARFARIN SODIUM 2.5 MG/1
6 TABLET ORAL ONCE
Refills: 0 | Status: COMPLETED | OUTPATIENT
Start: 2019-06-12 | End: 2019-06-12

## 2019-06-12 RX ORDER — RANITIDINE HYDROCHLORIDE 150 MG/1
2 TABLET, FILM COATED ORAL
Qty: 0 | Refills: 0 | DISCHARGE

## 2019-06-12 RX ORDER — GLUCAGON INJECTION, SOLUTION 0.5 MG/.1ML
1 INJECTION, SOLUTION SUBCUTANEOUS ONCE
Refills: 0 | Status: DISCONTINUED | OUTPATIENT
Start: 2019-06-12 | End: 2019-06-20

## 2019-06-12 RX ORDER — ATORVASTATIN CALCIUM 80 MG/1
1 TABLET, FILM COATED ORAL
Qty: 0 | Refills: 0 | DISCHARGE

## 2019-06-12 RX ORDER — VANCOMYCIN HCL 1 G
1000 VIAL (EA) INTRAVENOUS EVERY 24 HOURS
Refills: 0 | Status: DISCONTINUED | OUTPATIENT
Start: 2019-06-13 | End: 2019-06-14

## 2019-06-12 RX ORDER — VANCOMYCIN HCL 1 G
500 VIAL (EA) INTRAVENOUS EVERY 12 HOURS
Refills: 0 | Status: DISCONTINUED | OUTPATIENT
Start: 2019-06-12 | End: 2019-06-12

## 2019-06-12 RX ORDER — DEXTROSE 50 % IN WATER 50 %
12.5 SYRINGE (ML) INTRAVENOUS ONCE
Refills: 0 | Status: DISCONTINUED | OUTPATIENT
Start: 2019-06-12 | End: 2019-06-20

## 2019-06-12 RX ORDER — ASPIRIN/CALCIUM CARB/MAGNESIUM 324 MG
1 TABLET ORAL
Qty: 0 | Refills: 0 | DISCHARGE

## 2019-06-12 RX ORDER — DEXTROSE 50 % IN WATER 50 %
15 SYRINGE (ML) INTRAVENOUS ONCE
Refills: 0 | Status: DISCONTINUED | OUTPATIENT
Start: 2019-06-12 | End: 2019-06-20

## 2019-06-12 RX ORDER — ALLOPURINOL 300 MG
100 TABLET ORAL DAILY
Refills: 0 | Status: DISCONTINUED | OUTPATIENT
Start: 2019-06-12 | End: 2019-06-20

## 2019-06-12 RX ORDER — ATORVASTATIN CALCIUM 80 MG/1
40 TABLET, FILM COATED ORAL AT BEDTIME
Refills: 0 | Status: DISCONTINUED | OUTPATIENT
Start: 2019-06-12 | End: 2019-06-20

## 2019-06-12 RX ORDER — FUROSEMIDE 40 MG
40 TABLET ORAL
Refills: 0 | Status: DISCONTINUED | OUTPATIENT
Start: 2019-06-12 | End: 2019-06-13

## 2019-06-12 RX ORDER — INSULIN LISPRO 100/ML
VIAL (ML) SUBCUTANEOUS
Refills: 0 | Status: DISCONTINUED | OUTPATIENT
Start: 2019-06-12 | End: 2019-06-18

## 2019-06-12 RX ORDER — SODIUM CHLORIDE 9 MG/ML
2700 INJECTION INTRAMUSCULAR; INTRAVENOUS; SUBCUTANEOUS ONCE
Refills: 0 | Status: COMPLETED | OUTPATIENT
Start: 2019-06-12 | End: 2019-06-12

## 2019-06-12 RX ORDER — ACETAMINOPHEN 500 MG
650 TABLET ORAL ONCE
Refills: 0 | Status: COMPLETED | OUTPATIENT
Start: 2019-06-12 | End: 2019-06-12

## 2019-06-12 RX ORDER — PIPERACILLIN AND TAZOBACTAM 4; .5 G/20ML; G/20ML
3.38 INJECTION, POWDER, LYOPHILIZED, FOR SOLUTION INTRAVENOUS EVERY 8 HOURS
Refills: 0 | Status: DISCONTINUED | OUTPATIENT
Start: 2019-06-12 | End: 2019-06-14

## 2019-06-12 RX ORDER — PIPERACILLIN AND TAZOBACTAM 4; .5 G/20ML; G/20ML
3.38 INJECTION, POWDER, LYOPHILIZED, FOR SOLUTION INTRAVENOUS EVERY 12 HOURS
Refills: 0 | Status: DISCONTINUED | OUTPATIENT
Start: 2019-06-12 | End: 2019-06-12

## 2019-06-12 RX ADMIN — Medication 81 MILLIGRAM(S): at 17:43

## 2019-06-12 RX ADMIN — PIPERACILLIN AND TAZOBACTAM 3.38 GRAM(S): 4; .5 INJECTION, POWDER, LYOPHILIZED, FOR SOLUTION INTRAVENOUS at 09:50

## 2019-06-12 RX ADMIN — Medication 975 MILLIGRAM(S): at 10:15

## 2019-06-12 RX ADMIN — PIPERACILLIN AND TAZOBACTAM 25 GRAM(S): 4; .5 INJECTION, POWDER, LYOPHILIZED, FOR SOLUTION INTRAVENOUS at 17:43

## 2019-06-12 RX ADMIN — SODIUM CHLORIDE 2700 MILLILITER(S): 9 INJECTION INTRAMUSCULAR; INTRAVENOUS; SUBCUTANEOUS at 09:10

## 2019-06-12 RX ADMIN — SODIUM CHLORIDE 2700 MILLILITER(S): 9 INJECTION INTRAMUSCULAR; INTRAVENOUS; SUBCUTANEOUS at 12:09

## 2019-06-12 RX ADMIN — Medication 650 MILLIGRAM(S): at 21:23

## 2019-06-12 RX ADMIN — ATORVASTATIN CALCIUM 40 MILLIGRAM(S): 80 TABLET, FILM COATED ORAL at 22:06

## 2019-06-12 RX ADMIN — Medication 250 MILLIGRAM(S): at 10:37

## 2019-06-12 RX ADMIN — Medication 975 MILLIGRAM(S): at 09:20

## 2019-06-12 RX ADMIN — WARFARIN SODIUM 6 MILLIGRAM(S): 2.5 TABLET ORAL at 22:06

## 2019-06-12 RX ADMIN — Medication 40 MILLIGRAM(S): at 17:43

## 2019-06-12 RX ADMIN — PIPERACILLIN AND TAZOBACTAM 200 GRAM(S): 4; .5 INJECTION, POWDER, LYOPHILIZED, FOR SOLUTION INTRAVENOUS at 09:20

## 2019-06-12 RX ADMIN — Medication 100 MILLIGRAM(S): at 17:43

## 2019-06-12 RX ADMIN — Medication 1000 MILLIGRAM(S): at 11:37

## 2019-06-12 NOTE — ED PROVIDER NOTE - ADMIT DISPOSITION PRESENT ON ADMISSION SEPSIS Q1 - RE-EVALUATED PATIENT FLUID AND VITAL SIGNS
I have re-evaluated the patient's fluid status and reviewed vital signs. Clinical perfusion assessment was performed.

## 2019-06-12 NOTE — ED PROVIDER NOTE - CLINICAL SUMMARY MEDICAL DECISION MAKING FREE TEXT BOX
The patient presents with SOB, fever and cough and CXR c/w R sided pneumonia and most likely septic and will admit for further evaluation.

## 2019-06-12 NOTE — ED ADULT TRIAGE NOTE - CHIEF COMPLAINT QUOTE
Patient brought in by ambulance form [facility] for SOB 83%RA.  Placed on nonrebreather and given 40mg lasix.  Patient arrived to ED tachypneic/febrile, called Sepsis initiated.

## 2019-06-12 NOTE — ED PROVIDER NOTE - CARE PLAN
Principal Discharge DX:	Pneumonia  Secondary Diagnosis:	Hyperlipidemia  Secondary Diagnosis:	Essential hypertension  Secondary Diagnosis:	Valvular heart disease  Secondary Diagnosis:	Sepsis

## 2019-06-12 NOTE — ED ADULT NURSE NOTE - NSIMPLEMENTINTERV_GEN_ALL_ED
Implemented All Fall Risk Interventions:  [place] to call system. Call bell, personal items and telephone within reach. Instruct patient to call for assistance. Room bathroom lighting operational. Non-slip footwear when patient is off stretcher. Physically safe environment: no spills, clutter or unnecessary equipment. Stretcher in lowest position, wheels locked, appropriate side rails in place. Provide visual cue, wrist band, yellow gown, etc. Monitor gait and stability. Monitor for mental status changes and reorient to person, place, and time. Review medications for side effects contributing to fall risk. Reinforce activity limits and safety measures with patient and family.

## 2019-06-12 NOTE — PHYSICAL THERAPY INITIAL EVALUATION ADULT - ADDITIONAL COMMENTS
Pt lives in a 1 story house with granddaughter, 3 steps to enter.  Comes to us from a [facility], reports being unable to ambulate at [facility]. requires assist with all ADLs and self care.

## 2019-06-12 NOTE — ED PROVIDER NOTE - PMH
Atrial arrhythmia    Essential hypertension    Gastroesophageal reflux disease without esophagitis    Heart murmur    Hyperlipidemia    Iron deficiency anemia    Obesity    Transient cerebral ischemia, unspecified transient cerebral ischemia type    Valvular heart disease

## 2019-06-12 NOTE — ED ADULT NURSE NOTE - OBJECTIVE STATEMENT
79 year old male in no acute distress, alert and oriented x 4 BIBA from [facility] for SOB, code sepsis was called, See Sepsis interdisciplinary Documentation.

## 2019-06-12 NOTE — ED ADULT NURSE NOTE - ED STAT RN HANDOFF DETAILS
Report given and pt endorsed to receiving RN's Ricarda BROWNING and Fuentes BROWNING for follow up and continuity of care

## 2019-06-12 NOTE — H&P ADULT - HISTORY OF PRESENT ILLNESS
78 y/o male with h/o A Fib on coumadin CHF, Gout and arthritis who was admitted to [facility] about 9 days ago for incapacitation and unable to ambulate. patient developed SOB and chills this morning. no cough. no headache nausea or vomiting. no diarrhea or dysuria but frequent urination. In the ER, Temp: 101.2. O2 was 87, improved with O2 NC . CXR showed right upper and lower pneumonia.

## 2019-06-12 NOTE — CONSULT NOTE ADULT - SUBJECTIVE AND OBJECTIVE BOX
Patient is a 79y old  Male who presents with a chief complaint of SOB (2019 11:55) - 2 days  Found with elevated creatinine, seen by Dr. Wilks in the past      HPI:  78 y/o male with h/o A Fib on coumadin CHF, Gout and arthritis who was admitted to [facility] about 9 days ago for incapacitation and unable to ambulate. patient developed SOB and chills this morning. no cough. no headache nausea or vomiting. no diarrhea or dysuria but frequent urination. In the ER, Temp: 101.2. O2 was 87, improved with O2 NC . CXR showed right upper and lower pneumonia. (2019 11:55)      PAST MEDICAL & SURGICAL HISTORY:  Gout  OA both KJ, presently LKJ painful  Diabetes  CRI (chronic renal insufficiency)  Chronic CHF  Atrial fibrillation  Heart murmur  Valvular heart disease  Iron deficiency anemia  Gastroesophageal reflux disease without esophagitis  Hyperlipidemia  Essential hypertension  Transient cerebral ischemia, unspecified transient cerebral ischemia type  No significant past surgical history      FAMILY HISTORY:      Social History:   Previous smoker   - Alcohol   -    Drugs        REVIEW OF SYSTEMS:  CONSTITUTIONAL: No fever, weight loss, or fatigue  EYES: No eye pain, No visual disturbances,   RESPIRATORY: No cough, hemoptysis; - SOB  CARDIOVASCULAR: No chest pain, No palpitations,   -leg swelling  GASTROINTESTINAL: No abdominal , NVD or GIB  GENITOURINARY: No UTI sx/hematuria  NEUROLOGICAL: No HA/wk/numbness  MUSCULOSKELETAL: + Knee joint pain, No swelling      Vital Signs Last 24 Hrs  T(C): 37.3 (2019 15:29), Max: 38.4 (2019 08:57)  T(F): 99.2 (2019 15:29), Max: 101.2 (2019 08:57)  HR: 65 (2019 15:29) (62 - 89)  BP: 122/69 (2019 15:29) (110/59 - 170/79)  BP(mean): --  RR: 18 (2019 15:29) (18 - 28)  SpO2: 96% (2019 15:29) (87% - 99%)    PHYSICAL EXAM:    GENERAL: NAD,   EYES:  conjunctiva and sclera clear  NECK: Supple, No JVD/Carotid Bruit -ve   NERVOUS SYSTEM:  A/O x3,   Lungs : No rales, No rhonchi,   HEART:  No murmur,  No rub, No gallops  ABDOMEN: Soft, NT/ND BS+  EXTREMITIES:  + Peripheral Pulses, - edema LKJ tender, no erythema  SKIN: No rashes or lesions      LABS:                        11.9   12.6  )-----------( 187      ( 2019 09:20 )             36.7         136  |  100  |  42.0<H>  ----------------------------<  187<H>  4.5   |  23.0  |  1.98<H>    Ca    9.4      2019 09:20    TPro  7.1  /  Alb  2.9<L>  /  TBili  0.5  /  DBili  x   /  AST  32  /  ALT  33  /  AlkPhos  132<H>      PT/INR - ( 2019 09:20 )   PT: 31.2 sec;   INR: 2.63 ratio         PTT - ( 2019 09:20 )  PTT:39.5 sec  Urinalysis Basic - ( 2019 09:20 )    Color: Yellow / Appearance: Clear / S.010 / pH: x  Gluc: x / Ketone: Negative  / Bili: Negative / Urobili: Negative mg/dL   Blood: x / Protein: 30 mg/dL / Nitrite: Negative   Leuk Esterase: Negative / RBC: 0-2 /HPF / WBC x   Sq Epi: x / Non Sq Epi: Occasional / Bacteria: Negative      RADIOLOGY & ADDITIONAL TESTS: CXR R UL and LL infiltrate    MEDICATIONS  (STANDING):  allopurinol  aspirin enteric coated  atorvastatin  dextrose 40% Gel PRN  dextrose 5%.  dextrose 50% Injectable  dextrose 50% Injectable  dextrose 50% Injectable  furosemide    Tablet  glucagon  Injectable PRN  insulin lispro (HumaLOG) corrective regimen sliding scale  metoprolol succinate ER  pantoprazole    Tablet  piperacillin/tazobactam IVPB.  warfarin

## 2019-06-12 NOTE — ED ADULT NURSE NOTE - CHIEF COMPLAINT QUOTE
Patient brought in by ambulance form [facility] for SOB 83%RA.  Placed on nonrebreather and given 40mg lasix.  Patient arrived to ED tachypneic/febrile, called Sepsis initiated.

## 2019-06-12 NOTE — ED PROVIDER NOTE - OBJECTIVE STATEMENT
The patient is a 79 year old male presents with SOB, fever and cough for one day sent in from [facility]. Mild bodyache, No abd pain, No chest pain

## 2019-06-12 NOTE — ED ADULT NURSE REASSESSMENT NOTE - NS ED NURSE REASSESS COMMENT FT1
Lab was called and made aware of stat lactate activation and will send someone to draw pt. Also made aware pt will be moved to CDU.

## 2019-06-12 NOTE — H&P ADULT - NSICDXPASTMEDICALHX_GEN_ALL_CORE_FT
PAST MEDICAL HISTORY:  Atrial fibrillation     Chronic CHF     CRI (chronic renal insufficiency)     Diabetes     Essential hypertension     Gastroesophageal reflux disease without esophagitis     Gout     Heart murmur     Hyperlipidemia     Iron deficiency anemia     Transient cerebral ischemia, unspecified transient cerebral ischemia type     Valvular heart disease

## 2019-06-13 LAB
ALBUMIN SERPL ELPH-MCNC: 2.3 G/DL — LOW (ref 3.3–5.2)
ALP SERPL-CCNC: 124 U/L — HIGH (ref 40–120)
ALT FLD-CCNC: 34 U/L — SIGNIFICANT CHANGE UP
ANION GAP SERPL CALC-SCNC: 11 MMOL/L — SIGNIFICANT CHANGE UP (ref 5–17)
APTT BLD: 38.6 SEC — HIGH (ref 27.5–36.3)
AST SERPL-CCNC: 34 U/L — SIGNIFICANT CHANGE UP
BILIRUB SERPL-MCNC: 0.6 MG/DL — SIGNIFICANT CHANGE UP (ref 0.4–2)
BODY SURFACE AREA CALCULATION: 1.73 M2 — SIGNIFICANT CHANGE UP
BUN SERPL-MCNC: 45 MG/DL — HIGH (ref 8–20)
CALCIUM SERPL-MCNC: 8.8 MG/DL — SIGNIFICANT CHANGE UP (ref 8.6–10.2)
CHLORIDE SERPL-SCNC: 100 MMOL/L — SIGNIFICANT CHANGE UP (ref 98–107)
CO2 SERPL-SCNC: 25 MMOL/L — SIGNIFICANT CHANGE UP (ref 22–29)
COLLECT DURATION TIME UR: 24 HR — SIGNIFICANT CHANGE UP
CREAT CL ?TM UR+SERPL-VRATE: 20 ML/MIN — LOW (ref 85–125)
CREAT SERPL-MCNC: 2.21 MG/DL — HIGH (ref 0.5–1.3)
CULTURE RESULTS: NO GROWTH — SIGNIFICANT CHANGE UP
GLUCOSE BLDC GLUCOMTR-MCNC: 193 MG/DL — HIGH (ref 70–99)
GLUCOSE BLDC GLUCOMTR-MCNC: 266 MG/DL — HIGH (ref 70–99)
GLUCOSE BLDC GLUCOMTR-MCNC: 352 MG/DL — HIGH (ref 70–99)
GLUCOSE BLDC GLUCOMTR-MCNC: 355 MG/DL — HIGH (ref 70–99)
GLUCOSE SERPL-MCNC: 179 MG/DL — HIGH (ref 70–115)
HBA1C BLD-MCNC: 9.1 % — HIGH (ref 4–5.6)
INR BLD: 3.77 RATIO — HIGH (ref 0.88–1.16)
POTASSIUM SERPL-MCNC: 4.2 MMOL/L — SIGNIFICANT CHANGE UP (ref 3.5–5.3)
POTASSIUM SERPL-SCNC: 4.2 MMOL/L — SIGNIFICANT CHANGE UP (ref 3.5–5.3)
PROT 24H UR-MRATE: 214 MG/24HR — HIGH (ref 50–100)
PROT SERPL-MCNC: 5.7 G/DL — LOW (ref 6.6–8.7)
PROTHROM AB SERPL-ACNC: 45.2 SEC — HIGH (ref 10–12.9)
SODIUM SERPL-SCNC: 136 MMOL/L — SIGNIFICANT CHANGE UP (ref 135–145)
SPECIMEN SOURCE: SIGNIFICANT CHANGE UP
TOTAL VOLUME - 24 HOUR: 1525 ML — SIGNIFICANT CHANGE UP
URINE CREATININE CALCULATION: 0.6 G/24 HR — LOW (ref 1–2)

## 2019-06-13 PROCEDURE — 76770 US EXAM ABDO BACK WALL COMP: CPT | Mod: 26

## 2019-06-13 PROCEDURE — 99233 SBSQ HOSP IP/OBS HIGH 50: CPT

## 2019-06-13 RX ORDER — INSULIN GLARGINE 100 [IU]/ML
15 INJECTION, SOLUTION SUBCUTANEOUS AT BEDTIME
Refills: 0 | Status: DISCONTINUED | OUTPATIENT
Start: 2019-06-13 | End: 2019-06-17

## 2019-06-13 RX ORDER — GABAPENTIN 400 MG/1
100 CAPSULE ORAL THREE TIMES A DAY
Refills: 0 | Status: DISCONTINUED | OUTPATIENT
Start: 2019-06-13 | End: 2019-06-20

## 2019-06-13 RX ORDER — FUROSEMIDE 40 MG
40 TABLET ORAL DAILY
Refills: 0 | Status: DISCONTINUED | OUTPATIENT
Start: 2019-06-13 | End: 2019-06-16

## 2019-06-13 RX ORDER — INSULIN LISPRO 100/ML
VIAL (ML) SUBCUTANEOUS AT BEDTIME
Refills: 0 | Status: DISCONTINUED | OUTPATIENT
Start: 2019-06-13 | End: 2019-06-18

## 2019-06-13 RX ORDER — TRAMADOL HYDROCHLORIDE 50 MG/1
25 TABLET ORAL
Refills: 0 | Status: DISCONTINUED | OUTPATIENT
Start: 2019-06-13 | End: 2019-06-19

## 2019-06-13 RX ORDER — ACETAMINOPHEN 500 MG
650 TABLET ORAL EVERY 6 HOURS
Refills: 0 | Status: DISCONTINUED | OUTPATIENT
Start: 2019-06-13 | End: 2019-06-20

## 2019-06-13 RX ORDER — SACCHAROMYCES BOULARDII 250 MG
250 POWDER IN PACKET (EA) ORAL
Refills: 0 | Status: DISCONTINUED | OUTPATIENT
Start: 2019-06-13 | End: 2019-06-19

## 2019-06-13 RX ADMIN — Medication 200 MILLIGRAM(S): at 06:22

## 2019-06-13 RX ADMIN — PIPERACILLIN AND TAZOBACTAM 25 GRAM(S): 4; .5 INJECTION, POWDER, LYOPHILIZED, FOR SOLUTION INTRAVENOUS at 17:42

## 2019-06-13 RX ADMIN — Medication 81 MILLIGRAM(S): at 08:36

## 2019-06-13 RX ADMIN — Medication 250 MILLIGRAM(S): at 17:43

## 2019-06-13 RX ADMIN — Medication 250 MILLIGRAM(S): at 06:22

## 2019-06-13 RX ADMIN — Medication 40 MILLIGRAM(S): at 06:21

## 2019-06-13 RX ADMIN — GABAPENTIN 100 MILLIGRAM(S): 400 CAPSULE ORAL at 22:41

## 2019-06-13 RX ADMIN — Medication 5: at 17:38

## 2019-06-13 RX ADMIN — Medication 100 MILLIGRAM(S): at 08:36

## 2019-06-13 RX ADMIN — TRAMADOL HYDROCHLORIDE 25 MILLIGRAM(S): 50 TABLET ORAL at 12:42

## 2019-06-13 RX ADMIN — INSULIN GLARGINE 15 UNIT(S): 100 INJECTION, SOLUTION SUBCUTANEOUS at 22:41

## 2019-06-13 RX ADMIN — Medication 1: at 08:35

## 2019-06-13 RX ADMIN — PIPERACILLIN AND TAZOBACTAM 25 GRAM(S): 4; .5 INJECTION, POWDER, LYOPHILIZED, FOR SOLUTION INTRAVENOUS at 09:55

## 2019-06-13 RX ADMIN — GABAPENTIN 100 MILLIGRAM(S): 400 CAPSULE ORAL at 13:39

## 2019-06-13 RX ADMIN — ATORVASTATIN CALCIUM 40 MILLIGRAM(S): 80 TABLET, FILM COATED ORAL at 22:41

## 2019-06-13 RX ADMIN — Medication 3: at 22:42

## 2019-06-13 RX ADMIN — Medication 40 MILLIGRAM(S): at 12:44

## 2019-06-13 RX ADMIN — PANTOPRAZOLE SODIUM 40 MILLIGRAM(S): 20 TABLET, DELAYED RELEASE ORAL at 06:21

## 2019-06-13 RX ADMIN — Medication 3: at 12:43

## 2019-06-13 RX ADMIN — PIPERACILLIN AND TAZOBACTAM 25 GRAM(S): 4; .5 INJECTION, POWDER, LYOPHILIZED, FOR SOLUTION INTRAVENOUS at 00:37

## 2019-06-13 NOTE — PROGRESS NOTE ADULT - SUBJECTIVE AND OBJECTIVE BOX
seen for bacteremia    no acute complaints.  no sob/cp/cough. no abdominal discomfort. previously with constipation, resolved now  ros otherwise negative    MEDICATIONS  (STANDING):  allopurinol 100 milliGRAM(s) Oral daily  aspirin enteric coated 81 milliGRAM(s) Oral daily  atorvastatin 40 milliGRAM(s) Oral at bedtime  dextrose 5%. 1000 milliLiter(s) (50 mL/Hr) IV Continuous <Continuous>  dextrose 50% Injectable 12.5 Gram(s) IV Push once  dextrose 50% Injectable 25 Gram(s) IV Push once  dextrose 50% Injectable 25 Gram(s) IV Push once  furosemide    Tablet 40 milliGRAM(s) Oral daily  insulin glargine Injectable (LANTUS) 15 Unit(s) SubCutaneous at bedtime  insulin lispro (HumaLOG) corrective regimen sliding scale   SubCutaneous three times a day before meals  metoprolol succinate  milliGRAM(s) Oral daily  pantoprazole    Tablet 40 milliGRAM(s) Oral before breakfast  piperacillin/tazobactam IVPB. 3.375 Gram(s) IV Intermittent every 8 hours  vancomycin  IVPB 1000 milliGRAM(s) IV Intermittent every 24 hours    MEDICATIONS  (PRN):  acetaminophen   Tablet .. 650 milliGRAM(s) Oral every 6 hours PRN Temp greater or equal to 38C (100.4F), Mild Pain (1 - 3), Moderate Pain (4 - 6)  dextrose 40% Gel 15 Gram(s) Oral once PRN Blood Glucose LESS THAN 70 milliGRAM(s)/deciliter  glucagon  Injectable 1 milliGRAM(s) IntraMuscular once PRN Glucose LESS THAN 70 milligrams/deciliter  traMADol 25 milliGRAM(s) Oral four times a day PRN Severe Pain (7 - 10)      Allergies    No Known Allergies      Vital Signs Last 24 Hrs  T(C): 37.3 (2019 10:30), Max: 38 (2019 21:05)  T(F): 99.2 (2019 10:30), Max: 100.4 (2019 21:05)  HR: 80 (2019 10:30) (65 - 94)  BP: 126/65 (2019 10:30) (122/69 - 174/74)  BP(mean): --  RR: 18 (2019 10:30) (17 - 18)  SpO2: 100% (2019 10:30) (92% - 100%)    PHYSICAL EXAM:    GENERAL: NAD  CHEST/LUNG: Clear to percussion bilaterally; No rales, rhonchi, wheezing, or rubs  HEART: Regular rate and rhythm; S1 S2;  ABDOMEN: Soft, Nontender, Nondistended; Bowel sounds present  EXTREMITIES: no edema  NERVOUS SYSTEM:  Alert & Oriented X3, nonfocal.  LE pain due to chronic arthritis/neuropathy    LABS:                        11.9   12.6  )-----------( 187      ( 2019 09:20 )             36.7     06-    136  |  100  |  45.0<H>  ----------------------------<  179<H>  4.2   |  25.0  |  2.21<H>    Ca    8.8      2019 06:04    TPro  5.7<L>  /  Alb  2.3<L>  /  TBili  0.6  /  DBili  x   /  AST  34  /  ALT  34  /  AlkPhos  124<H>  06-13    PT/INR - ( 2019 06:04 )   PT: 45.2 sec;   INR: 3.77 ratio         PTT - ( 2019 06:04 )  PTT:38.6 sec  Urinalysis Basic - ( 2019 09:20 )    Color: Yellow / Appearance: Clear / S.010 / pH: x  Gluc: x / Ketone: Negative  / Bili: Negative / Urobili: Negative mg/dL   Blood: x / Protein: 30 mg/dL / Nitrite: Negative   Leuk Esterase: Negative / RBC: 0-2 /HPF / WBC x   Sq Epi: x / Non Sq Epi: Occasional / Bacteria: Negative        CAPILLARY BLOOD GLUCOSE      POCT Blood Glucose.: 193 mg/dL (2019 08:24)  POCT Blood Glucose.: 193 mg/dL (2019 22:05)  POCT Blood Glucose.: 103 mg/dL (2019 17:41)        RADIOLOGY & ADDITIONAL TESTS:

## 2019-06-13 NOTE — PROGRESS NOTE ADULT - SUBJECTIVE AND OBJECTIVE BOX
Patient seen and examined    Feels better, stronger  no c/o CP cough SOB NV   No swelling feet    Vital Signs Last 24 Hrs  T(C): 37.1 (13 Jun 2019 16:20), Max: 38 (12 Jun 2019 21:05)  T(F): 98.7 (13 Jun 2019 16:20), Max: 100.4 (12 Jun 2019 21:05)  HR: 70 (13 Jun 2019 16:20) (70 - 94)  BP: 128/61 (13 Jun 2019 16:20) (126/65 - 174/74)  BP(mean): --  RR: 18 (13 Jun 2019 16:20) (17 - 18)  SpO2: 98% (13 Jun 2019 16:20) (92% - 100%)    PHYSICAL EXAM    GENERAL: NAD,   EYES:  conjunctiva and sclera clear  NECK: Supple, No JVD/Bruit  NERVOUS SYSTEM:  A/O x3,   CHEST:  No rales, occ rhonchi  HEART:  RRR, No murmur  ABDOMEN: Soft, NT/ND BS+  EXTREMITIES:  No Edema;  SKIN: No rashes    13 Jun 2019 06:04    136    |  100    |  45.0   ----------------------------<  179    4.2     |  25.0   |  2.21     Ca    8.8        13 Jun 2019 06:04    TPro  5.7    /  Alb  2.3    /  TBili  0.6    /  DBili  x      /  AST  34     /  ALT  34     /  AlkPhos  124    13 Jun 2019 06:04                          11.9   12.6  )-----------( 187      ( 12 Jun 2019 09:20 )             36.7

## 2019-06-13 NOTE — PROGRESS NOTE ADULT - ASSESSMENT
CKD - Creatinine at baseline  PNA with [facility] in [place] on BC - on Abx  feeling better  Continue to watch

## 2019-06-13 NOTE — PROGRESS NOTE ADULT - ASSESSMENT
80 yo M w/ hx CAD, HF, pafib, CKD4 (baseline cr 2), gout, peripheral neuropathy, diabetes type 2 presents from rehab facility for shortness of breath.  Noted to be febrile and tachycardic in ER.    per daughter, patient recently treated for gout flare up at [facility] and discharged to rehab.      sepsis due to enterococcus bacteremia, suspected gram negative pneumonia (although no cough).     sepsis present on admission, resolved    CT abdomen with PO contrast    ID consulted    repeat blood cultures ordered for am    c/w vanc, likely do not need zosyn.    CKD4: baseline seems around 2    hold valsartan, renal following    CAD/pafib: c/w aspirin/statin/toprol    HOLD coumadin tonight given supratherapeutic INR    no events on telemetry, dc telemetry    follows with Dr Medina if needed.    Dm2: hold oral agents    lantus 15U qhs, raiss     a1c 9.1    peripheral neuropathy: add gabapentin 100mg TID and increase as tolerated    chronic pain from osteoarthritis, gout and neuropathy: prn tylenol/tramadol    dispo: unknown, once bacteremia clears up.    ultimately ABRAM    updated patient and daughter at bedside.

## 2019-06-14 LAB
-  AMPICILLIN: SIGNIFICANT CHANGE UP
-  AMPICILLIN: SIGNIFICANT CHANGE UP
-  DAPTOMYCIN: SIGNIFICANT CHANGE UP
-  ERYTHROMYCIN: SIGNIFICANT CHANGE UP
-  GENTAMICIN SYNERGY: SIGNIFICANT CHANGE UP
-  GENTAMICIN SYNERGY: SIGNIFICANT CHANGE UP
-  LEVOFLOXACIN: SIGNIFICANT CHANGE UP
-  LINEZOLID: SIGNIFICANT CHANGE UP
-  PENICILLIN: SIGNIFICANT CHANGE UP
-  STREPTOMYCIN SYNERGY: SIGNIFICANT CHANGE UP
-  STREPTOMYCIN SYNERGY: SIGNIFICANT CHANGE UP
-  TETRACYCLINE: SIGNIFICANT CHANGE UP
-  VANCOMYCIN: SIGNIFICANT CHANGE UP
-  VANCOMYCIN: SIGNIFICANT CHANGE UP
ALBUMIN SERPL ELPH-MCNC: 2.5 G/DL — LOW (ref 3.3–5.2)
ALP SERPL-CCNC: 123 U/L — HIGH (ref 40–120)
ALT FLD-CCNC: 31 U/L — SIGNIFICANT CHANGE UP
ANION GAP SERPL CALC-SCNC: 13 MMOL/L — SIGNIFICANT CHANGE UP (ref 5–17)
APTT BLD: 43.1 SEC — HIGH (ref 27.5–36.3)
AST SERPL-CCNC: 23 U/L — SIGNIFICANT CHANGE UP
BASOPHILS # BLD AUTO: 0 K/UL — SIGNIFICANT CHANGE UP (ref 0–0.2)
BASOPHILS NFR BLD AUTO: 0.2 % — SIGNIFICANT CHANGE UP (ref 0–2)
BILIRUB SERPL-MCNC: 0.5 MG/DL — SIGNIFICANT CHANGE UP (ref 0.4–2)
BODY SURFACE AREA CALCULATION: 1.73 M2 — SIGNIFICANT CHANGE UP
BUN SERPL-MCNC: 45 MG/DL — HIGH (ref 8–20)
CALCIUM SERPL-MCNC: 9 MG/DL — SIGNIFICANT CHANGE UP (ref 8.6–10.2)
CHLORIDE SERPL-SCNC: 96 MMOL/L — LOW (ref 98–107)
CO2 SERPL-SCNC: 24 MMOL/L — SIGNIFICANT CHANGE UP (ref 22–29)
COLLECT DURATION TIME UR: 24 HR — SIGNIFICANT CHANGE UP
CREAT CL ?TM UR+SERPL-VRATE: 15 ML/MIN — LOW (ref 85–125)
CREAT SERPL-MCNC: 2.33 MG/DL — HIGH (ref 0.5–1.3)
CULTURE RESULTS: SIGNIFICANT CHANGE UP
CULTURE RESULTS: SIGNIFICANT CHANGE UP
EOSINOPHIL # BLD AUTO: 0.1 K/UL — SIGNIFICANT CHANGE UP (ref 0–0.5)
EOSINOPHIL NFR BLD AUTO: 1.8 % — SIGNIFICANT CHANGE UP (ref 0–5)
GLUCOSE BLDC GLUCOMTR-MCNC: 319 MG/DL — HIGH (ref 70–99)
GLUCOSE BLDC GLUCOMTR-MCNC: 326 MG/DL — HIGH (ref 70–99)
GLUCOSE BLDC GLUCOMTR-MCNC: 419 MG/DL — HIGH (ref 70–99)
GLUCOSE SERPL-MCNC: 312 MG/DL — HIGH (ref 70–115)
HCT VFR BLD CALC: 29 % — LOW (ref 42–52)
HGB BLD-MCNC: 9.5 G/DL — LOW (ref 14–18)
INR BLD: 3.55 RATIO — HIGH (ref 0.88–1.16)
LYMPHOCYTES # BLD AUTO: 0.7 K/UL — LOW (ref 1–4.8)
LYMPHOCYTES # BLD AUTO: 14.3 % — LOW (ref 20–55)
MCHC RBC-ENTMCNC: 28.6 PG — SIGNIFICANT CHANGE UP (ref 27–31)
MCHC RBC-ENTMCNC: 32.8 G/DL — SIGNIFICANT CHANGE UP (ref 32–36)
MCV RBC AUTO: 87.3 FL — SIGNIFICANT CHANGE UP (ref 80–94)
METHOD TYPE: SIGNIFICANT CHANGE UP
METHOD TYPE: SIGNIFICANT CHANGE UP
MONOCYTES # BLD AUTO: 0.5 K/UL — SIGNIFICANT CHANGE UP (ref 0–0.8)
MONOCYTES NFR BLD AUTO: 10.6 % — HIGH (ref 3–10)
NEUTROPHILS # BLD AUTO: 3.7 K/UL — SIGNIFICANT CHANGE UP (ref 1.8–8)
NEUTROPHILS NFR BLD AUTO: 72.7 % — SIGNIFICANT CHANGE UP (ref 37–73)
ORGANISM # SPEC MICROSCOPIC CNT: SIGNIFICANT CHANGE UP
PLATELET # BLD AUTO: 134 K/UL — LOW (ref 150–400)
POTASSIUM SERPL-MCNC: 3.6 MMOL/L — SIGNIFICANT CHANGE UP (ref 3.5–5.3)
POTASSIUM SERPL-SCNC: 3.6 MMOL/L — SIGNIFICANT CHANGE UP (ref 3.5–5.3)
PROT 24H UR-MRATE: 160 MG/24HR — HIGH (ref 50–100)
PROT SERPL-MCNC: 6.3 G/DL — LOW (ref 6.6–8.7)
PROTHROM AB SERPL-ACNC: 42.4 SEC — HIGH (ref 10–12.9)
RBC # BLD: 3.32 M/UL — LOW (ref 4.6–6.2)
RBC # FLD: 16.4 % — HIGH (ref 11–15.6)
SODIUM SERPL-SCNC: 133 MMOL/L — LOW (ref 135–145)
SPECIMEN SOURCE: SIGNIFICANT CHANGE UP
SPECIMEN SOURCE: SIGNIFICANT CHANGE UP
TOTAL VOLUME - 24 HOUR: 1600 ML — SIGNIFICANT CHANGE UP
URINE CREATININE CALCULATION: 0.5 G/24 HR — LOW (ref 1–2)
VANCOMYCIN TROUGH SERPL-MCNC: 13.5 UG/ML — SIGNIFICANT CHANGE UP (ref 10–20)
WBC # BLD: 5.1 K/UL — SIGNIFICANT CHANGE UP (ref 4.8–10.8)
WBC # FLD AUTO: 5.1 K/UL — SIGNIFICANT CHANGE UP (ref 4.8–10.8)

## 2019-06-14 PROCEDURE — 99223 1ST HOSP IP/OBS HIGH 75: CPT

## 2019-06-14 PROCEDURE — 74176 CT ABD & PELVIS W/O CONTRAST: CPT | Mod: 26

## 2019-06-14 PROCEDURE — 99233 SBSQ HOSP IP/OBS HIGH 50: CPT

## 2019-06-14 RX ORDER — AMPICILLIN TRIHYDRATE 250 MG
2 CAPSULE ORAL ONCE
Refills: 0 | Status: COMPLETED | OUTPATIENT
Start: 2019-06-14 | End: 2019-06-14

## 2019-06-14 RX ORDER — AMPICILLIN TRIHYDRATE 250 MG
2 CAPSULE ORAL EVERY 8 HOURS
Refills: 0 | Status: DISCONTINUED | OUTPATIENT
Start: 2019-06-14 | End: 2019-06-15

## 2019-06-14 RX ORDER — AMPICILLIN TRIHYDRATE 250 MG
CAPSULE ORAL
Refills: 0 | Status: DISCONTINUED | OUTPATIENT
Start: 2019-06-14 | End: 2019-06-15

## 2019-06-14 RX ADMIN — PIPERACILLIN AND TAZOBACTAM 25 GRAM(S): 4; .5 INJECTION, POWDER, LYOPHILIZED, FOR SOLUTION INTRAVENOUS at 08:25

## 2019-06-14 RX ADMIN — Medication 250 MILLIGRAM(S): at 06:27

## 2019-06-14 RX ADMIN — Medication 216 GRAM(S): at 17:42

## 2019-06-14 RX ADMIN — Medication 200 MILLIGRAM(S): at 06:27

## 2019-06-14 RX ADMIN — Medication 100 MILLIGRAM(S): at 13:53

## 2019-06-14 RX ADMIN — PANTOPRAZOLE SODIUM 40 MILLIGRAM(S): 20 TABLET, DELAYED RELEASE ORAL at 06:27

## 2019-06-14 RX ADMIN — PIPERACILLIN AND TAZOBACTAM 25 GRAM(S): 4; .5 INJECTION, POWDER, LYOPHILIZED, FOR SOLUTION INTRAVENOUS at 01:51

## 2019-06-14 RX ADMIN — Medication 250 MILLIGRAM(S): at 17:23

## 2019-06-14 RX ADMIN — Medication 250 MILLIGRAM(S): at 07:32

## 2019-06-14 RX ADMIN — Medication 4: at 08:25

## 2019-06-14 RX ADMIN — GABAPENTIN 100 MILLIGRAM(S): 400 CAPSULE ORAL at 06:27

## 2019-06-14 RX ADMIN — Medication 6: at 17:23

## 2019-06-14 RX ADMIN — GABAPENTIN 100 MILLIGRAM(S): 400 CAPSULE ORAL at 14:00

## 2019-06-14 RX ADMIN — Medication 81 MILLIGRAM(S): at 13:53

## 2019-06-14 RX ADMIN — Medication 40 MILLIGRAM(S): at 06:27

## 2019-06-14 NOTE — PROGRESS NOTE ADULT - ASSESSMENT
CKD: MENDY   PNA with bacteremia==> clinically improving  - monitor on current diuretics and adjust dose as needed  - check Vanco level  - avoid potential nephrotoxins  - watch labs

## 2019-06-14 NOTE — PROGRESS NOTE ADULT - ASSESSMENT
62 year old male with PMH HTN, dyslipidemia, ESRD on HD, DM, CAD s/p CABG, CKD3 presented with dyspnea, fever and chills.   T101.2, WBC 12.6, Lact 2.2, SCr 1.98, INR 2.63 at admission      Multifocal pneumonia with associated sepsis present upon admission. Now afebrile> CT chest with bilateral infiltrates RUL, RML and BRETT.  - Continue IV antibiotics  - Continue Supplemental O2, Nebs PRN  - Follow up repeat cultures      Enterococcus fecalis bacteremia  - Repeat culture  - Continue Vancomycin  - CT abdomen and Pelvis    AF  - Continue BB  - continue to hold Coumadin as INR elevated 62 year old male with PMH HTN, dyslipidemia, ESRD on HD, DM, CAD s/p CABG, CKD3 presented with dyspnea, fever and chills.   T101.2, WBC 12.6, Lact 2.2, SCr 1.98, INR 2.63 at admission      Multifocal pneumonia with associated sepsis present upon admission. Now afebrile> CT chest with bilateral infiltrates RUL, RML and BRETT.  - Continue IV antibiotics  - Continue Supplemental O2, Nebs PRN  - Follow up repeat cultures      Enterococcus fecalis bacteremia  - Repeat culture  - Continue Vancomycin  - CT abdomen and Pelvis  - TTE    AF, rate controlled, INR supra-therapeutic  - Continue BB  - continue to hold Coumadin as INR elevated    DMT2, A1c 9.1. Good glycemic control currently  - Continue BGM and Insulins    GERD  - Continue PPI    Gout  - Continue Allopurinol    Dyslipidemia  - Continue Statin    Prophylactic measure  - INR therapeutic for VTEP  - Florastor for C. diff    Disposition - pending clinical improvement and resolution of bacteremia. May need 62 year old male with PMH HTN, dyslipidemia, ESRD on HD, DM, CAD s/p CABG, CKD3 presented with dyspnea, fever and chills.   T101.2, WBC 12.6, Lact 2.2, SCr 1.98, INR 2.63 at admission      Multifocal pneumonia with associated sepsis present upon admission. Now afebrile> CT chest with bilateral infiltrates RUL, RML and BRETT.  - Continue IV antibiotics  - Continue Supplemental O2, Nebs PRN  - Follow up repeat cultures      Enterococcus fecalis bacteremia  - Repeat culture  - Continue Vancomycin  - CT abdomen and Pelvis  - TTE    CKD4  - Avoid  nephrotoxin  - Monitor SCr    AF, rate controlled, INR supra-therapeutic  - Continue BB  - continue to hold Coumadin as INR elevated    DMT2, A1c 9.1. Good glycemic control currently  - Continue BGM and Insulins    GERD  - Continue PPI    Gout  - Continue Allopurinol    Dyslipidemia  - Continue Statin    Prophylactic measure  - INR therapeutic for VTEP  - Florastor for C. diff    Disposition - pending clinical improvement and resolution of bacteremia. May need

## 2019-06-14 NOTE — PROGRESS NOTE ADULT - SUBJECTIVE AND OBJECTIVE BOX
NEPHROLOGY INTERVAL HPI/OVERNIGHT EVENTS:  pt resting comfortable  c/o generalized weakness and difficulty ambulating  no cp, sob, n/v/d    MEDICATIONS  (STANDING):  allopurinol 100 milliGRAM(s) Oral daily  aspirin enteric coated 81 milliGRAM(s) Oral daily  atorvastatin 40 milliGRAM(s) Oral at bedtime  dextrose 5%. 1000 milliLiter(s) (50 mL/Hr) IV Continuous <Continuous>  dextrose 50% Injectable 12.5 Gram(s) IV Push once  dextrose 50% Injectable 25 Gram(s) IV Push once  dextrose 50% Injectable 25 Gram(s) IV Push once  furosemide    Tablet 40 milliGRAM(s) Oral daily  gabapentin 100 milliGRAM(s) Oral three times a day  insulin glargine Injectable (LANTUS) 15 Unit(s) SubCutaneous at bedtime  insulin lispro (HumaLOG) corrective regimen sliding scale   SubCutaneous three times a day before meals  insulin lispro (HumaLOG) corrective regimen sliding scale   SubCutaneous at bedtime  metoprolol succinate  milliGRAM(s) Oral daily  pantoprazole    Tablet 40 milliGRAM(s) Oral before breakfast  piperacillin/tazobactam IVPB. 3.375 Gram(s) IV Intermittent every 8 hours  saccharomyces boulardii 250 milliGRAM(s) Oral two times a day  vancomycin  IVPB 1000 milliGRAM(s) IV Intermittent every 24 hours    MEDICATIONS  (PRN):  acetaminophen   Tablet .. 650 milliGRAM(s) Oral every 6 hours PRN Temp greater or equal to 38C (100.4F), Mild Pain (1 - 3), Moderate Pain (4 - 6)  dextrose 40% Gel 15 Gram(s) Oral once PRN Blood Glucose LESS THAN 70 milliGRAM(s)/deciliter  glucagon  Injectable 1 milliGRAM(s) IntraMuscular once PRN Glucose LESS THAN 70 milligrams/deciliter  traMADol 25 milliGRAM(s) Oral four times a day PRN Severe Pain (7 - 10)      Allergies    No Known Allergies    Intolerances          Vital Signs Last 24 Hrs  T(C): 37.1 (14 Jun 2019 10:03), Max: 37.3 (13 Jun 2019 10:30)  T(F): 98.7 (14 Jun 2019 10:03), Max: 99.2 (13 Jun 2019 10:30)  HR: 72 (14 Jun 2019 10:03) (62 - 80)  BP: 134/72 (14 Jun 2019 10:03) (126/65 - 157/79)  BP(mean): --  RR: 17 (14 Jun 2019 10:03) (16 - 18)  SpO2: 94% (14 Jun 2019 06:22) (94% - 100%)    PHYSICAL EXAM:  GENERAL: NAD, appears chronically ill  NECK: Supple, No JVD/Bruit  NERVOUS SYSTEM:  A/O x3, non focal  CHEST: Clear but diminished BS at bases  HEART:  RRR, No rub  ABDOMEN: Soft, NT/ND BS+  EXTREMITIES:  No LE edema  SKIN: No rashes    LABS:                        9.5    5.1   )-----------( 134      ( 14 Jun 2019 06:32 )             29.0     06-14    x   |  x   |  x   ----------------------------<  x   x    |  x   |  2.33<H>    Creatinine, Serum: 1.98 mg/dL (06.12.19 @ 09:20)        Ca    9.0      14 Jun 2019 06:32    TPro  6.3<L>  /  Alb  2.5<L>  /  TBili  0.5  /  DBili  x   /  AST  23  /  ALT  31  /  AlkPhos  123<H>  06-14    PT/INR - ( 14 Jun 2019 06:32 )   PT: 42.4 sec;   INR: 3.55 ratio         PTT - ( 14 Jun 2019 06:32 )  PTT:43.1 sec        RADIOLOGY & ADDITIONAL TESTS:  < from: US Kidney and Bladder (06.13.19 @ 11:27) >   EXAM:  US KIDNEYS AND BLADDER                          PROCEDURE DATE:  06/13/2019          INTERPRETATION:  CLINICAL INFORMATION: Renal sufficiency.    COMPARISON: None available.    TECHNIQUE: Sonography of the kidneys and bladder.     FINDINGS:    Right kidney:  11.5 cm. No renal mass, hydronephrosis or calculi.    Left kidney:  10.3 cm. No renal mass, hydronephrosis or calculi.    Urinary bladder: Within normal limits. Prevoid volume 165 mL. Bilateral   ureteral jets noted. Post void volume 11.9 mL.    IMPRESSION:     Normal renal ultrasound.      < end of copied text >

## 2019-06-14 NOTE — CONSULT NOTE ADULT - SUBJECTIVE AND OBJECTIVE BOX
[facility] [place] CHANCE PAGAN MD FACP   MAK MCCONNELL MD  Diplomates American Board of Internal Medicine and Infecctious Diseases  [phone]  [phone] HERMINIA [id number]      HPI:  78 y/o male with h/o A Fib on coumadin CHF, Gout and arthritis who was admitted to [facility] about 9 days ago for incapacitation and unable to ambulate. patient developed SOB and chills this morning. no cough. no headache nausea or vomiting. no diarrhea or dysuria but frequent urination. In the ER, Temp: 101.2. O2 was 87, improved with O2 NC . CXR showed right upper and lower pneumonia.   PT WITH BLOOD CX ENTEROCOCCAL BACTEREMIA  ASKED TO EVALUATE FROM ID STANDPOINT       PAST MEDICAL & SURGICAL HISTORY:  Gout  Diabetes  CRI (chronic renal insufficiency)  Chronic CHF  Atrial fibrillation  Heart murmur  Valvular heart disease  Iron deficiency anemia  Gastroesophageal reflux disease without esophagitis  Hyperlipidemia  Essential hypertension  Transient cerebral ischemia, unspecified transient cerebral ischemia type  No significant past surgical history      ANTIBIOTICS  piperacillin/tazobactam IVPB. 3.375 Gram(s) IV Intermittent every 8 hours  vancomycin  IVPB 1000 milliGRAM(s) IV Intermittent every 24 hours      Allergies    No Known Allergies    Intolerances        SOCIAL HISTORY:       FAMILY HX   FAMILY HISTORY:      Vital Signs Last 24 Hrs  T(C): 37.1 (14 Jun 2019 10:03), Max: 37.2 (13 Jun 2019 22:37)  T(F): 98.7 (14 Jun 2019 10:03), Max: 98.9 (13 Jun 2019 22:37)  HR: 72 (14 Jun 2019 10:03) (62 - 72)  BP: 134/72 (14 Jun 2019 10:03) (134/72 - 157/79)  BP(mean): --  RR: 17 (14 Jun 2019 10:03) (16 - 17)  SpO2: 94% (14 Jun 2019 06:22) (94% - 95%)  Drug Dosing Weight  Height (cm): 177.8 (12 Jun 2019 21:57)  Weight (kg): 88.2 (12 Jun 2019 21:57)  BMI (kg/m2): 27.9 (12 Jun 2019 21:57)  BSA (m2): 2.06 (12 Jun 2019 21:57)      REVIEW OF SYSTEMS:    CONSTITUTIONAL:  As per HPI.    HEENT:  Eyes:  No diplopia or blurred vision. ENT:  No earache, sore throat or runny nose.    CARDIOVASCULAR:  No pressure, squeezing, strangling, tightness, heaviness or aching about the chest, neck, axilla or epigastrium.    RESPIRATORY:  No cough, shortness of breath, PND or orthopnea.    GASTROINTESTINAL:  No nausea, vomiting or diarrhea.    GENITOURINARY:  No dysuria, frequency or urgency.    MUSCULOSKELETAL:  As per HPI.    SKIN:  No change in skin, hair or nails.    NEUROLOGIC:  No paresthesias, fasciculations, seizures or weakness.                  PHYSICAL EXAMINATION:    GENERAL: The patient is a well-developed, well-nourished _____IN NAD OOB TO CHAIR  VITAL SIGNS: T(C): 37.1 (06-14-19 @ 10:03), Max: 37.2 (06-13-19 @ 22:37)  HR: 72 (06-14-19 @ 10:03) (62 - 72)  BP: 134/72 (06-14-19 @ 10:03) (134/72 - 157/79)  RR: 17 (06-14-19 @ 10:03) (16 - 17)  SpO2: 94% (06-14-19 @ 06:22) (94% - 95%)  Wt(kg): --    HEENT: Head is normocephalic and atraumatic.  ANICTERIC  NECK: Supple. No carotid bruits.  No lymphadenopathy or thyromegaly.    LUNGS:COARSE BREATH SOUNDS    HEART: Regular rate and rhythm without murmur.    ABDOMEN: Soft, nontender, and nondistended.  Positive bowel sounds.  No hepatosplenomegaly was noted. NO REBOUND NO GUARDING    EXTREMITIES: NO EDEMA NO ERYTHEMA    NEUROLOGIC: NON FOCAL      SKIN: No ulceration or induration present. NO RASH        BLOOD CULTURES  Culture Results:   Growth in aerobic and anaerobic bottles: Enterococcus faecalis  Aerobic Bottle: 11.56 Hours to positivity  Anaerobic Bottle: 13:26 Hours to positivity  .  ***Blood Panel PCR results on this specimen are available  approximately 3 hours after the Gram stain result.***  Gram stain, PCR, and/or culture results may not always  correspond due to difference in methodologies.  ************************************************************  This PCR assay was performed using [employer].  The following targets are tested for: Enterococcus,  vancomycin resistant enterococci, Listeria monocytogenes,  coagulase negative staphylococci, S. aureus,  methicillin resistant S. aureus, Streptococcus agalactiae  (Group B), S. pneumoniae, S. pyogenes (Group A),  Acinetobacter baumannii, Enterobacter cloacae, E. coli,  Klebsiella oxytoca, K. pneumoniae, Proteus sp.,  Serratia marcescens, Haemophilus influenzae,  Neisseria meningitidis, Pseudomonas aeruginosa, Candida  albicans, C. glabrata, C krusei, C parapsilosis,  C. tropicalis and the KPC resistance gene.  "Due to technical problems, Proteus sp. will Not be reported as part of  the BCID panel until further notice"  .  TYPE: (C=Critical, N=Notification, A=Abnormal) C  TESTS:  _ GS BLD  DATE/TIME CALLED: _ 06/12/2019 22:53:24  CALLED TO: Maria Dolores RANGEL  READ BACK (2 Patient Identifiers)(Y/N): _ Y  READ BACK VALUES (Y/N): _ Y  CALLED BY: Maria Dolores BACK (06-12 @ 09:23)  Culture Results:   Growth in aerobic and anaerobic bottles: Enterococcus faecalis  Aerobic Bottle: 12:26 Hours to positivity  Anaerobic Bottle: 12:36 Hours to positivity  .  TYPE: (C=Critical, N=Notification, A=Abnormal) C  TESTS:  _ Positive Blood GS  DATE/TIME CALLED: _ 06/13/2019 09:50  CALLED TO: Maria Dolores SalgadoTWR: Maude Matias RN  READ BACK (2 Patient Identifiers)(Y/N): _ Y  READ BACK VALUES (Y/N): _ Y  CALLED BY: Maria Dolores patricia (06-12 @ 09:22)  Culture Results:   No growth (06-12 @ 09:20)       URINE CX          LABS:                        9.5    5.1   )-----------( 134      ( 14 Jun 2019 06:32 )             29.0     06-14    x   |  x   |  x   ----------------------------<  x   x    |  x   |  2.33<H>    Ca    9.0      14 Jun 2019 06:32    TPro  6.3<L>  /  Alb  2.5<L>  /  TBili  0.5  /  DBili  x   /  AST  23  /  ALT  31  /  AlkPhos  123<H>  06-14    PT/INR - ( 14 Jun 2019 06:32 )   PT: 42.4 sec;   INR: 3.55 ratio         PTT - ( 14 Jun 2019 06:32 )  PTT:43.1 sec      RADIOLOGY & ADDITIONAL STUDIES:      ASSESSMENT/PLAN  78 y/o male with h/o A Fib on coumadin CHF, Gout and arthritis who was admitted to [facility] about 9 days ago for incapacitation and unable to ambulate. patient developed SOB and chills this morning. no cough. no headache nausea or vomiting. no diarrhea or dysuria but frequent urination. In the ER, Temp: 101.2. O2 was 87, improved with O2 NC . CXR showed right upper and lower pneumonia.   PT WITH BLOOD CX ENTEROCOCCAL BACTEREMIA  SENS TO AMP WILL CHANGE TO AMPICILLIN PT FOR ECHO TO EVAL FOR VEGETATIONS  AND CT ABD PELVIS TO LOOK FOR SOURCE  WILL FOLLOW UP WITH FURTHER RECOMMENDATIONS              MAK ASHBY MD [facility] [place] CHANCE PAGAN MD FACP   MAK MCCONNELL MD  Diplomates American Board of Internal Medicine and Infecctious Diseases  [phone]  [phone] HERMINIA [id number]      HPI:  80 y/o male with h/o A Fib on coumadin CHF, Gout and arthritis who was admitted to [facility] about 9 days ago for incapacitation and unable to ambulate. patient developed SOB and chills this morning. no cough. no headache nausea or vomiting. no diarrhea or dysuria but frequent urination. In the ER, Temp: 101.2. O2 was 87, improved with O2 NC . CXR showed right upper and lower pneumonia.   PT WITH BLOOD CX ENTEROCOCCAL BACTEREMIA  ASKED TO EVALUATE FROM ID STANDPOINT       PAST MEDICAL & SURGICAL HISTORY:  Gout  Diabetes  CRI (chronic renal insufficiency)  Chronic CHF  Atrial fibrillation  Heart murmur  Valvular heart disease  Iron deficiency anemia  Gastroesophageal reflux disease without esophagitis  Hyperlipidemia  Essential hypertension  Transient cerebral ischemia, unspecified transient cerebral ischemia type  No significant past surgical history      ANTIBIOTICS  piperacillin/tazobactam IVPB. 3.375 Gram(s) IV Intermittent every 8 hours  vancomycin  IVPB 1000 milliGRAM(s) IV Intermittent every 24 hours      Allergies    No Known Allergies    Intolerances        SOCIAL HISTORY:       FAMILY HX   FAMILY HISTORY:      Vital Signs Last 24 Hrs  T(C): 37.1 (14 Jun 2019 10:03), Max: 37.2 (13 Jun 2019 22:37)  T(F): 98.7 (14 Jun 2019 10:03), Max: 98.9 (13 Jun 2019 22:37)  HR: 72 (14 Jun 2019 10:03) (62 - 72)  BP: 134/72 (14 Jun 2019 10:03) (134/72 - 157/79)  BP(mean): --  RR: 17 (14 Jun 2019 10:03) (16 - 17)  SpO2: 94% (14 Jun 2019 06:22) (94% - 95%)  Drug Dosing Weight  Height (cm): 177.8 (12 Jun 2019 21:57)  Weight (kg): 88.2 (12 Jun 2019 21:57)  BMI (kg/m2): 27.9 (12 Jun 2019 21:57)  BSA (m2): 2.06 (12 Jun 2019 21:57)      REVIEW OF SYSTEMS:    CONSTITUTIONAL:  As per HPI.    HEENT:  Eyes:  No diplopia or blurred vision. ENT:  No earache, sore throat or runny nose.    CARDIOVASCULAR:  No pressure, squeezing, strangling, tightness, heaviness or aching about the chest, neck, axilla or epigastrium.    RESPIRATORY:  No cough, shortness of breath, PND or orthopnea.    GASTROINTESTINAL:  No nausea, vomiting or diarrhea.    GENITOURINARY:  No dysuria, frequency or urgency.    MUSCULOSKELETAL:  As per HPI.    SKIN:  No change in skin, hair or nails.    NEUROLOGIC:  No paresthesias, fasciculations, seizures or weakness.                  PHYSICAL EXAMINATION:    GENERAL: The patient is a well-developed, well-nourished _____IN NAD OOB TO CHAIR  VITAL SIGNS: T(C): 37.1 (06-14-19 @ 10:03), Max: 37.2 (06-13-19 @ 22:37)  HR: 72 (06-14-19 @ 10:03) (62 - 72)  BP: 134/72 (06-14-19 @ 10:03) (134/72 - 157/79)  RR: 17 (06-14-19 @ 10:03) (16 - 17)  SpO2: 94% (06-14-19 @ 06:22) (94% - 95%)  Wt(kg): --    HEENT: Head is normocephalic and atraumatic.  ANICTERIC  NECK: Supple. No carotid bruits.  No lymphadenopathy or thyromegaly.    LUNGS:COARSE BREATH SOUNDS    HEART: Regular rate and rhythm without murmur.    ABDOMEN: Soft, nontender, and nondistended.  Positive bowel sounds.  No hepatosplenomegaly was noted. NO REBOUND NO GUARDING    EXTREMITIES: NO EDEMA NO ERYTHEMA    NEUROLOGIC: NON FOCAL      SKIN: No ulceration or induration present. NO RASH        BLOOD CULTURES  Culture Results:   Growth in aerobic and anaerobic bottles: Enterococcus faecalis  Aerobic Bottle: 11.56 Hours to positivity  Anaerobic Bottle: 13:26 Hours to positivity  .  ***Blood Panel PCR results on this specimen are available  approximately 3 hours after the Gram stain result.***  Gram stain, PCR, and/or culture results may not always  correspond due to difference in methodologies.  ************************************************************  This PCR assay was performed using [employer].  The following targets are tested for: Enterococcus,  vancomycin resistant enterococci, Listeria monocytogenes,  coagulase negative staphylococci, S. aureus,  methicillin resistant S. aureus, Streptococcus agalactiae  (Group B), S. pneumoniae, S. pyogenes (Group A),  Acinetobacter baumannii, Enterobacter cloacae, E. coli,  Klebsiella oxytoca, K. pneumoniae, Proteus sp.,  Serratia marcescens, Haemophilus influenzae,  Neisseria meningitidis, Pseudomonas aeruginosa, Candida  albicans, C. glabrata, C krusei, C parapsilosis,  C. tropicalis and the KPC resistance gene.  "Due to technical problems, Proteus sp. will Not be reported as part of  the BCID panel until further notice"  .  TYPE: (C=Critical, N=Notification, A=Abnormal) C  TESTS:  _ GS BLD  DATE/TIME CALLED: _ 06/12/2019 22:53:24  CALLED TO: Maria Dolores RANGEL  READ BACK (2 Patient Identifiers)(Y/N): _ Y  READ BACK VALUES (Y/N): _ Y  CALLED BY: Maria Dolores BACK (06-12 @ 09:23)  Culture Results:   Growth in aerobic and anaerobic bottles: Enterococcus faecalis  Aerobic Bottle: 12:26 Hours to positivity  Anaerobic Bottle: 12:36 Hours to positivity  .  TYPE: (C=Critical, N=Notification, A=Abnormal) C  TESTS:  _ Positive Blood GS  DATE/TIME CALLED: _ 06/13/2019 09:50  CALLED TO: Maria Dolores SalgadoTWR: Maude Matias RN  READ BACK (2 Patient Identifiers)(Y/N): _ Y  READ BACK VALUES (Y/N): _ Y  CALLED BY: Maria Dolores patricia (06-12 @ 09:22)  Culture Results:   No growth (06-12 @ 09:20)       URINE CX          LABS:                        9.5    5.1   )-----------( 134      ( 14 Jun 2019 06:32 )             29.0     06-14    x   |  x   |  x   ----------------------------<  x   x    |  x   |  2.33<H>    Ca    9.0      14 Jun 2019 06:32    TPro  6.3<L>  /  Alb  2.5<L>  /  TBili  0.5  /  DBili  x   /  AST  23  /  ALT  31  /  AlkPhos  123<H>  06-14    PT/INR - ( 14 Jun 2019 06:32 )   PT: 42.4 sec;   INR: 3.55 ratio         PTT - ( 14 Jun 2019 06:32 )  PTT:43.1 sec      RADIOLOGY & ADDITIONAL STUDIES:      ASSESSMENT/PLAN  80 y/o male with h/o A Fib on coumadin CHF, Gout and arthritis who was admitted to [facility] about 9 days ago for incapacitation and unable to ambulate. patient developed SOB and chills this morning. no cough. no headache nausea or vomiting. no diarrhea or dysuria but frequent urination. In the ER, Temp: 101.2. O2 was 87, improved with O2 NC . CXR showed right upper and lower pneumonia.   PT WITH BLOOD CX ENTEROCOCCAL BACTEREMIA  SENS TO AMP WILL CHANGE TO AMPICILLIN PT FOR ECHO TO EVAL FOR VEGETATIONS  MAY NEED ABELINO AND EVENTUAL AMP/ROCEPHIN TX  AND CT ABD PELVIS TO LOOK FOR SOURCE  WILL FOLLOW UP WITH FURTHER RECOMMENDATIONS              MAK ASHBY MD

## 2019-06-15 LAB
ANION GAP SERPL CALC-SCNC: 12 MMOL/L — SIGNIFICANT CHANGE UP (ref 5–17)
APTT BLD: 39.8 SEC — HIGH (ref 27.5–36.3)
BUN SERPL-MCNC: 43 MG/DL — HIGH (ref 8–20)
CALCIUM SERPL-MCNC: 9.2 MG/DL — SIGNIFICANT CHANGE UP (ref 8.6–10.2)
CHLORIDE SERPL-SCNC: 96 MMOL/L — LOW (ref 98–107)
CO2 SERPL-SCNC: 27 MMOL/L — SIGNIFICANT CHANGE UP (ref 22–29)
CREAT SERPL-MCNC: 2.06 MG/DL — HIGH (ref 0.5–1.3)
FERRITIN SERPL-MCNC: 261 NG/ML — SIGNIFICANT CHANGE UP (ref 30–400)
GLUCOSE BLDC GLUCOMTR-MCNC: 347 MG/DL — HIGH (ref 70–99)
GLUCOSE BLDC GLUCOMTR-MCNC: 408 MG/DL — HIGH (ref 70–99)
GLUCOSE BLDC GLUCOMTR-MCNC: 433 MG/DL — HIGH (ref 70–99)
GLUCOSE BLDC GLUCOMTR-MCNC: 454 MG/DL — CRITICAL HIGH (ref 70–99)
GLUCOSE BLDC GLUCOMTR-MCNC: 458 MG/DL — CRITICAL HIGH (ref 70–99)
GLUCOSE BLDC GLUCOMTR-MCNC: 516 MG/DL — CRITICAL HIGH (ref 70–99)
GLUCOSE BLDC GLUCOMTR-MCNC: >530 MG/DL — CRITICAL HIGH (ref 70–99)
GLUCOSE SERPL-MCNC: 328 MG/DL — HIGH (ref 70–115)
HCT VFR BLD CALC: 31.1 % — LOW (ref 42–52)
HGB BLD-MCNC: 10.1 G/DL — LOW (ref 14–18)
INR BLD: 2.81 RATIO — HIGH (ref 0.88–1.16)
IRON SATN MFR SERPL: 19 % — SIGNIFICANT CHANGE UP (ref 16–55)
IRON SATN MFR SERPL: 42 UG/DL — LOW (ref 59–158)
MCHC RBC-ENTMCNC: 28.4 PG — SIGNIFICANT CHANGE UP (ref 27–31)
MCHC RBC-ENTMCNC: 32.5 G/DL — SIGNIFICANT CHANGE UP (ref 32–36)
MCV RBC AUTO: 87.4 FL — SIGNIFICANT CHANGE UP (ref 80–94)
PLATELET # BLD AUTO: 144 K/UL — LOW (ref 150–400)
POTASSIUM SERPL-MCNC: 3.8 MMOL/L — SIGNIFICANT CHANGE UP (ref 3.5–5.3)
POTASSIUM SERPL-SCNC: 3.8 MMOL/L — SIGNIFICANT CHANGE UP (ref 3.5–5.3)
PROTHROM AB SERPL-ACNC: 33.4 SEC — HIGH (ref 10–12.9)
RBC # BLD: 3.56 M/UL — LOW (ref 4.6–6.2)
RBC # FLD: 16.2 % — HIGH (ref 11–15.6)
SODIUM SERPL-SCNC: 135 MMOL/L — SIGNIFICANT CHANGE UP (ref 135–145)
TIBC SERPL-MCNC: 216 UG/DL — LOW (ref 220–430)
TRANSFERRIN SERPL-MCNC: 151 MG/DL — LOW (ref 180–329)
WBC # BLD: 4.3 K/UL — LOW (ref 4.8–10.8)
WBC # FLD AUTO: 4.3 K/UL — LOW (ref 4.8–10.8)

## 2019-06-15 PROCEDURE — 99233 SBSQ HOSP IP/OBS HIGH 50: CPT

## 2019-06-15 RX ORDER — WARFARIN SODIUM 2.5 MG/1
5 TABLET ORAL ONCE
Refills: 0 | Status: COMPLETED | OUTPATIENT
Start: 2019-06-15 | End: 2019-06-15

## 2019-06-15 RX ORDER — INSULIN LISPRO 100/ML
6 VIAL (ML) SUBCUTANEOUS ONCE
Refills: 0 | Status: COMPLETED | OUTPATIENT
Start: 2019-06-15 | End: 2019-06-15

## 2019-06-15 RX ORDER — AMPICILLIN SODIUM AND SULBACTAM SODIUM 250; 125 MG/ML; MG/ML
3 INJECTION, POWDER, FOR SUSPENSION INTRAMUSCULAR; INTRAVENOUS EVERY 8 HOURS
Refills: 0 | Status: DISCONTINUED | OUTPATIENT
Start: 2019-06-15 | End: 2019-06-20

## 2019-06-15 RX ADMIN — Medication 216 GRAM(S): at 00:26

## 2019-06-15 RX ADMIN — Medication 100 MILLIGRAM(S): at 12:10

## 2019-06-15 RX ADMIN — GABAPENTIN 100 MILLIGRAM(S): 400 CAPSULE ORAL at 22:55

## 2019-06-15 RX ADMIN — WARFARIN SODIUM 5 MILLIGRAM(S): 2.5 TABLET ORAL at 22:55

## 2019-06-15 RX ADMIN — Medication 4: at 08:33

## 2019-06-15 RX ADMIN — GABAPENTIN 100 MILLIGRAM(S): 400 CAPSULE ORAL at 14:07

## 2019-06-15 RX ADMIN — Medication 250 MILLIGRAM(S): at 07:01

## 2019-06-15 RX ADMIN — Medication 40 MILLIGRAM(S): at 07:01

## 2019-06-15 RX ADMIN — Medication 4: at 22:55

## 2019-06-15 RX ADMIN — GABAPENTIN 100 MILLIGRAM(S): 400 CAPSULE ORAL at 07:01

## 2019-06-15 RX ADMIN — Medication 216 GRAM(S): at 14:07

## 2019-06-15 RX ADMIN — ATORVASTATIN CALCIUM 40 MILLIGRAM(S): 80 TABLET, FILM COATED ORAL at 22:54

## 2019-06-15 RX ADMIN — Medication 216 GRAM(S): at 07:01

## 2019-06-15 RX ADMIN — INSULIN GLARGINE 15 UNIT(S): 100 INJECTION, SOLUTION SUBCUTANEOUS at 00:25

## 2019-06-15 RX ADMIN — Medication 6 UNIT(S): at 22:55

## 2019-06-15 RX ADMIN — GABAPENTIN 100 MILLIGRAM(S): 400 CAPSULE ORAL at 00:29

## 2019-06-15 RX ADMIN — Medication 200 MILLIGRAM(S): at 07:01

## 2019-06-15 RX ADMIN — ATORVASTATIN CALCIUM 40 MILLIGRAM(S): 80 TABLET, FILM COATED ORAL at 00:35

## 2019-06-15 RX ADMIN — Medication 250 MILLIGRAM(S): at 17:07

## 2019-06-15 RX ADMIN — INSULIN GLARGINE 15 UNIT(S): 100 INJECTION, SOLUTION SUBCUTANEOUS at 22:55

## 2019-06-15 RX ADMIN — Medication 81 MILLIGRAM(S): at 11:27

## 2019-06-15 RX ADMIN — Medication 6: at 16:58

## 2019-06-15 RX ADMIN — Medication 4: at 00:34

## 2019-06-15 RX ADMIN — Medication 6: at 11:27

## 2019-06-15 RX ADMIN — PANTOPRAZOLE SODIUM 40 MILLIGRAM(S): 20 TABLET, DELAYED RELEASE ORAL at 07:01

## 2019-06-15 RX ADMIN — AMPICILLIN SODIUM AND SULBACTAM SODIUM 200 GRAM(S): 250; 125 INJECTION, POWDER, FOR SUSPENSION INTRAMUSCULAR; INTRAVENOUS at 22:54

## 2019-06-15 NOTE — PROGRESS NOTE ADULT - ASSESSMENT
80 y/o male with h/o A Fib on coumadin CHF, Gout and arthritis who was admitted to [facility] about 9 days ago for incapacitation and unable to ambulate. patient developed SOB and chills this morning. no cough. no headache nausea or vomiting. no diarrhea or dysuria but frequent urination. In the ER, Temp: 101.2. O2 was 87, improved with O2 NC . CXR showed right upper and lower pneumonia.   PT WITH BLOOD CX ENTEROCOCCAL BACTEREMIA   on  AMPICILLIN    ECHO NEGATIVE BUT   WOULD CONSIDER ABELINO         CT ABD PELVIS DONE  PT DENIES COUGH WILL  CONTINUE AMP  REPEAT CXC PENDING  IF ENDOCARDITIS  WOULD NEED  AMP/ROCEPHIN TX 78 y/o male with h/o A Fib on coumadin CHF, Gout and arthritis who was admitted to [facility] about 9 days ago for incapacitation and unable to ambulate. patient developed SOB and chills this morning. no cough. no headache nausea or vomiting. no diarrhea or dysuria but frequent urination. In the ER, Temp: 101.2. O2 was 87, improved with O2 NC . CXR showed right upper and lower pneumonia.   PT WITH BLOOD CX ENTEROCOCCAL BACTEREMIA   on  AMPICILLIN    ECHO NEGATIVE BUT   WOULD CONSIDER ABELINO         CT ABD PELVIS DONE  PT DENIES COUGH  BUT WITH POSSIBLE PNEUMONIA WILL  BROADEN TO    UNASYN  FROM AMP  REPEAT CXC PENDING  IF ENDOCARDITIS  WOULD NEED  AMP/ROCEPHIN TX

## 2019-06-15 NOTE — PROGRESS NOTE ADULT - ASSESSMENT
CKD: MENDY slowly resolving  PNA with bacteremia==> clinically improving  - monitor on current diuretics and adjust dose as needed  - monitor Vanco levels periodically  - avoid potential nephrotoxins  - watch labs

## 2019-06-15 NOTE — PROGRESS NOTE ADULT - SUBJECTIVE AND OBJECTIVE BOX
[facility]  INFECTIOUS DISEASES AND INTERNAL MEDICINE at [place]  =======================================================  Francisco Gomez MD  Diplomates American Board of Internal Medicine and Infectious Diseases  =======================================================    TAI IFEANYI [id number]    Follow up: enterococcal bacteremia    Allergies:  No Known Allergies      Medications:  acetaminophen   Tablet .. 650 milliGRAM(s) Oral every 6 hours PRN  allopurinol 100 milliGRAM(s) Oral daily  ampicillin  IVPB      ampicillin  IVPB 2 Gram(s) IV Intermittent every 8 hours  aspirin enteric coated 81 milliGRAM(s) Oral daily  atorvastatin 40 milliGRAM(s) Oral at bedtime  dextrose 40% Gel 15 Gram(s) Oral once PRN  dextrose 5%. 1000 milliLiter(s) IV Continuous <Continuous>  dextrose 50% Injectable 12.5 Gram(s) IV Push once  dextrose 50% Injectable 25 Gram(s) IV Push once  dextrose 50% Injectable 25 Gram(s) IV Push once  furosemide    Tablet 40 milliGRAM(s) Oral daily  gabapentin 100 milliGRAM(s) Oral three times a day  glucagon  Injectable 1 milliGRAM(s) IntraMuscular once PRN  insulin glargine Injectable (LANTUS) 15 Unit(s) SubCutaneous at bedtime  insulin lispro (HumaLOG) corrective regimen sliding scale   SubCutaneous three times a day before meals  insulin lispro (HumaLOG) corrective regimen sliding scale   SubCutaneous at bedtime  metoprolol succinate  milliGRAM(s) Oral daily  pantoprazole    Tablet 40 milliGRAM(s) Oral before breakfast  saccharomyces boulardii 250 milliGRAM(s) Oral two times a day  traMADol 25 milliGRAM(s) Oral four times a day PRN  warfarin 5 milliGRAM(s) Oral once    SOCIAL       FAMILY   FAMILY HISTORY:    REVIEW OF SYSTEMS:  CONSTITUTIONAL:  No Fever or chills  HEENT:   No diplopia or blurred vision.  No earache, sore throat or runny nose.  CARDIOVASCULAR:  No pressure, squeezing, strangling, tightness, heaviness or aching about the chest, neck, axilla or epigastrium.  RESPIRATORY:  No cough, shortness of breath, PND or orthopnea.  GASTROINTESTINAL:  No nausea, vomiting or diarrhea.  GENITOURINARY:  No dysuria, frequency or urgency. No Blood in urine  MUSCULOSKELETAL:   AS PER HPI  SKIN:  No change in skin, hair or nails.  NEUROLOGIC:  No paresthesias, fasciculations, seizures or weakness.  PSYCHIATRIC:  No disorder of thought or mood.  ENDOCRINE:  No heat or cold intolerance, polyuria or polydipsia.  HEMATOLOGICAL:  No easy bruising or bleeding.            Physical Exam:  ICU Vital Signs Last 24 Hrs  T(C): 36.9 (15 Cristian 2019 07:45), Max: 37 (14 Jun 2019 23:53)  T(F): 98.4 (15 Cristian 2019 07:45), Max: 98.6 (14 Jun 2019 23:53)  HR: 65 (15 Cristian 2019 07:45) (65 - 75)  BP: 163/69 (15 Cristian 2019 07:45) (147/70 - 163/69)  BP(mean): --  ABP: --  ABP(mean): --  RR: 19 (15 Cristian 2019 07:45) (18 - 19)  SpO2: 97% (15 Cristian 2019 07:45) (95% - 97%)    GEN: NAD, pleasant  HEENT: normocephalic and atraumatic. EOMI. JOSE.    NECK: Supple. No carotid bruits.  No lymphadenopathy or thyromegaly.  LUNGS: Clear to auscultation.  HEART: Regular rate and rhythm without murmur.  ABDOMEN: Soft, nontender, and nondistended.  Positive bowel sounds.    : No CVA tenderness  EXTREMITIES: Without any cyanosis, clubbing, rash, lesions or edema.  MSK: no joint swelling  NEUROLOGIC: Cranial nerves II through XII are grossly intact.  PSYCHIATRIC: Appropriate affect .  SKIN: No ulceration or induration present.        Labs:  06-15    135  |  96<L>  |  43.0<H>  ----------------------------<  328<H>  3.8   |  27.0  |  2.06<H>    Ca    9.2      15 Cristian 2019 09:09    TPro  6.3<L>  /  Alb  2.5<L>  /  TBili  0.5  /  DBili  x   /  AST  23  /  ALT  31  /  AlkPhos  123<H>  06-14                          10.1   4.3   )-----------( 144      ( 15 Cristian 2019 09:09 )             31.1       PT/INR - ( 15 Cristian 2019 09:09 )   PT: 33.4 sec;   INR: 2.81 ratio         PTT - ( 15 Cristian 2019 09:09 )  PTT:39.8 sec    LIVER FUNCTIONS - ( 14 Jun 2019 06:32 )  Alb: 2.5 g/dL / Pro: 6.3 g/dL / ALK PHOS: 123 U/L / ALT: 31 U/L / AST: 23 U/L / GGT: x               CAPILLARY BLOOD GLUCOSE      POCT Blood Glucose.: 433 mg/dL (15 Cristian 2019 11:08)  POCT Blood Glucose.: 347 mg/dL (15 Cristian 2019 08:02)  POCT Blood Glucose.: 408 mg/dL (15 Cristian 2019 00:25)  POCT Blood Glucose.: 419 mg/dL (14 Jun 2019 17:02)        RECENT CULTURES:  06-12 @ 09:23 .Blood Enterococcus faecalis  Blood Culture PCR    Growth in aerobic and anaerobic bottles: Enterococcus faecalis  Aerobic Bottle: 11.56 Hours to positivity  Anaerobic Bottle: 13:26 Hours to positivity  .  ***Blood Panel PCR results on this specimen are available  approximately 3 hours after the Gram stain result.***  Gram stain, PCR, and/or culture results may not always  correspond due to difference in methodologies.  ************************************************************  This PCR assay was performed using [employer].  The following targets are tested for: Enterococcus,  vancomycin resistant enterococci, Listeria monocytogenes,  coagulase negative staphylococci, S. aureus,  methicillin resistant S. aureus, Streptococcus agalactiae  (Group B), S. pneumoniae, S. pyogenes (Group A),  Acinetobacter baumannii, Enterobacter cloacae, E. coli,  Klebsiella oxytoca, K. pneumoniae, Proteus sp.,  Serratia marcescens, Haemophilus influenzae,  Neisseria meningitidis, Pseudomonas aeruginosa, Candida  albicans, C. glabrata, C krusei, C parapsilosis,  C. tropicalis and the KPC resistance gene.  "Due to technical problems, Proteus sp. will Not be reported as part of  the BCID panel until further notice"  .  TYPE: (C=Critical, N=Notification, A=Abnormal) C  TESTS:  _ GS BLD  DATE/TIME CALLED: _ 06/12/2019 22:53:24  CALLED TO: _ JIAN RANGEL  READ BACK (2 Patient Identifiers)(Y/N): _ Y  READ BACK VALUES (Y/N): _ Y  CALLED BY: Maria Dolores BACK        06-12 @ 09:22 .Blood Enterococcus faecalis    Growth in aerobic and anaerobic bottles: Enterococcus faecalis  Aerobic Bottle: 12:26 Hours to positivity  Anaerobic Bottle: 12:36 Hours to positivity  .  TYPE: (C=Critical, N=Notification, A=Abnormal) C  TESTS:  _ Positive Blood GS  DATE/TIME CALLED: _ 06/13/2019 09:50  CALLED TO: Marai Dolores SalgadoTWR: Maude Matias RN  READ BACK (2 Patient Identifiers)(Y/N): _ Y  READ BACK VALUES (Y/N): _ Y  CALLED BY: Maria Dolores patricia        06-12 @ 09:20 .Urine     No growth

## 2019-06-15 NOTE — PROGRESS NOTE ADULT - SUBJECTIVE AND OBJECTIVE BOX
HOSPITALIST PROGRESS NOTE    IFEANYI LUJAN  [id number]  79yMale    Patient is a 79y old  Male who presents with a chief complaint of SOB (15 Cristian 2019 09:30)      SUBJECTIVE:   Chart reviewed since last visit.  Patient seen and examined at bedside for pneumonia, bacteremia.  Denies any dyspnea, cough, chest pain.  chills last night - 'diarrhea' (2 loose BM daily)      OBJECTIVE:  Vital Signs Last 24 Hrs  T(C): 36.9 (15 Cristian 2019 07:45), Max: 37 (2019 23:53)  T(F): 98.4 (15 Cristian 2019 07:45), Max: 98.6 (2019 23:53)  HR: 65 (15 Cristian 2019 07:45) (65 - 75)  BP: 163/69 (15 Cristian 2019 07:45) (147/70 - 163/69)   RR: 19 (15 Cristian 2019 07:45) (18 - 19)  SpO2: 97% (15 Cristian 2019 07:45) (95% - 97%)    PHYSICAL EXAMINATION  General: Sitting up in chair, NAD  HEENT:  extraocular movements intact, moist oral mucosa  NECK:  Supple  CVS: regular rate and rhythm   RESP:  Clear anteriorly - bibasilar crackles posteriorly   GI:  Soft nondistended nontender BS+  : No suprapubic tenderness  MSK:  FROM, trace edema  CNS:  Non focal exam  INTEG:  Warm dry skin  PSYCH:  Fair mood    MONITOR:  CAPILLARY BLOOD GLUCOSE      POCT Blood Glucose.: 433 mg/dL (15 Cristian 2019 11:08)  POCT Blood Glucose.: 347 mg/dL (15 Cristian 2019 08:02)  POCT Blood Glucose.: 408 mg/dL (15 Cristian 2019 00:25)  POCT Blood Glucose.: 419 mg/dL (2019 17:02)        I&O's Summary    2019 07:01  -  15 Cristian 2019 07:00  --------------------------------------------------------  IN: 240 mL / OUT: 2420 mL / NET: -2180 mL    15 Cristian 2019 07:01  -  15 Cristian 2019 12:46  --------------------------------------------------------  IN: 0 mL / OUT: 650 mL / NET: -650 mL                            10.1   4.3   )-----------( 144      ( 15 Cristian 2019 09:09 )             31.1     PT/INR - ( 15 Cristian 2019 09:09 )   PT: 33.4 sec;   INR: 2.81 ratio         PTT - ( 15 Cristian 2019 09:09 )  PTT:39.8 sec  06-15    135  |  96<L>  |  43.0<H>  ----------------------------<  328<H>  3.8   |  27.0  |  2.06<H>    Ca    9.2      15 Cristian 2019 09:09    TPro  6.3<L>  /  Alb  2.5<L>  /  TBili  0.5  /  DBili  x   /  AST  23  /  ALT  31  /  AlkPhos  123<H>              Culture:    TTE:  < from: TTE Echo Complete w/Doppler (19 @ 14:57) >    EXAM:  ECHO TRANSTHORACIC COMP W DOPP      PROCEDURE DATE:  2019   .      INTERPRETATION:  REPORT:    TRANSTHORACIC ECHOCARDIOGRAM REPORT         Patient Name:   IFEANYI LUJAN Patient Location: [facility] Rec #:  [id number]         Accession #:      [id number]  Account #:                       Height:           69.7 in 177.0 cm  YOB: 1940        Weight:           194.0 lb 88.00 kg  Patient Age:    79 years         BSA:              2.05 m²  Patient Gender: M        BP:               134/72 mmHg       Date of Exam:        2019 2:57:03 PM  Sonographer:         Vero Bolton  Referring Physician: Sharita Arango MD    Procedure:     2D Echo/Doppler/Color Doppler Complete.  Indications:   Acute and subacute infective endocarditis - I33.0  Diagnosis:     Acute and subacute infective endocarditis - I33.0  Study Details: Technically difficult study. Study quality was adversely   affected                 due to patient obesity and body habitus.         2D AND M-MODE MEASUREMENTS (normal ranges within parentheses):  Left                 Normal   Aorta/Left            Normal  Ventricle:                    Atrium:  IVSd (2D):    1.15  (0.7-1.1) Aortic Root  2.74 cm (2.4-3.7)                 cm    (2D):  LVPWd (2D):   1.05  (0.7-1.1) Left Atrium  4.56 cm (1.9-4.0)                 cm             (2D):  LVIDd (2D):   4.53  (3.4-5.7) LA Volume     39.2                 cm             Index         ml/m²  LVIDs (2D):   3.80                 cm  LV FS (2D):   16.1   (>25%)                  %  Relative Wall 0.46   (<0.42)  Thickness    LV SYSTOLIC FUNCTION BY 2D PLANIMETRY (MOD):  EF-A4C View: 66.3 % EF-A2C View: 76.7 % EF-Biplane: 71 %    SPECTRAL DOPPLER ANALYSIS (where applicable):  Aortic Valve: AoV Max Constantine: 1.08 m/s AoV Peak P.7 mmHg AoV Mean P.0 mmHg    LVOT Vmax: 0.82 m/s LVOT VTI: 0.167 m LVOT Diameter: 2.16 cm    AoV Area, Vmax: 2.78 cm² AoV Area, VTI: 2.90 cm² AoV Area, Vmn: 2.64 cm²  Ao VTI: 0.211  Tricuspid Valve and PA/RV Systolic Pressure: TR Max Velocity: 2.87 m/s RA   Pressure: 5 mmHg RVSP/PASP: 37.9 mmHg       PHYSICIAN INTERPRETATION:  Left Ventricle: The left ventricular internal cavity size is normal. Left   ventricular wall thickness is normal.  Global LVsystolic function was normal. Left ventricular ejection   fraction, by visual estimation, is 60 to 65%. The mitral in-flow pattern   reveals no discernable A-wave, therefore no comment on diastolic function   can be made.       LV Wall Scoring:  The basal inferolateral segment is akinetic.    Right Ventricle: The right ventricular size is mildly enlarged. RV   systolic function is normal. TV S' 0.1 m/s.  Left Atrium: Mild to moderately enlarged left atrium.  Right Atrium: Mildly enlarged right atrium.  Pericardium: There is no evidence of pericardial effusion.  Mitral Valve: The mitral valve is normal in structure. Trace mitral valve   regurgitation is seen.  Tricuspid Valve: The tricuspid valve is normal in structure. Trivial   tricuspid regurgitation is visualized. Estimated pulmonary artery   systolic pressure is 37.9 mmHg assuming a right atrial pressure of 5   mmHg, which is consistent with borderline pulmonary hypertension.  Aortic Valve: The aortic valve is trileaflet. Peak transaortic gradient   equals 4.7 mmHg, mean transaortic gradient equals 2.0 mmHg, the   calculated aortic valve area equals 2.90 cm² by the continuity equation   consistent with normally opening aortic valve. Mild aortic valve   regurgitation is seen.  Pulmonic Valve: The pulmonic valve was not well visualized. Trace   pulmonic valve regurgitation.  Aorta: The aortic root is normal in size and structure.  Pulmonary Artery: The pulmonary artery is not well seen.  Venous: A normal flow pattern is recordedfrom the right upper pulmonary   vein. The inferior vena cava is not well visualized.       Summary:   1. Left ventricular ejection fraction, by visual estimation, is 60 to   65%.   2. Technically difficult study.   3. Normal global left ventricular systolic function.   4. Basal inferolateral segment is abnormal as described above.   5. Normal left ventricular internal cavity size.   6. The mitral in-flow pattern reveals no discernable A-wave, therefore   no comment on diastolic function can be made.   7. There is no evidence of pericardial effusion.   8. Mild aortic regurgitation.   9. Estimated pulmonary artery systolic pressure is 37.9 mmHg assuming a   right atrial pressure of 5 mmHg, which is consistent with borderline   pulmonary hypertension.    MD Shaji Electronically signed on 2019 at 7:51:26 PM              *** Final ***                  VIMAL PATTEN MD  This document has been electronically signed. 2019  2:57PM    < end of copied text >    RADIOLOGY  < from: CT Abdomen and Pelvis w/ Oral Cont (. @ 16:27) >     EXAM:  CT ABDOMEN AND PELVIS OC                          PROCEDURE DATE:  2019          INTERPRETATION:  CLINICAL INFORMATION: Bacteremia. Enterococcus. Evaluate   for GI pathology.    COMPARISON: CT chest 2019 and abdominal ultrasound 2019    PROCEDURE:   CT of the Abdomen and Pelvis was performed without intravenous contrast.   Intravenous contrast: None.  Oral contrast: positive contrast was administered.  Sagittal and coronal reformats were performed.    FINDINGS:    LOWER CHEST: Small bilateral pleural effusions, left greater than right   an compressive atelectasis at the left lung base, unchanged since   2019. Linear atelectasis or scarring in the lingula.    LIVER: Normal size. Calcified granuloma at the posterior aspect of the   right hepatic lobe.  BILE DUCTS: Normal caliber.  GALLBLADDER: Small gallstones in the gallbladder fundus.  SPLEEN: Within normal limits.  PANCREAS: Atrophic.  ADRENALS: Within normal limits.  KIDNEYS/URETERS: 1.4 x 0.8 cm calculus in the left renal pelvis. No   hydronephrosis bilaterally.    BLADDER: Distended with a normal caliber wall.  REPRODUCTIVE ORGANS: The prostate gland is not enlarged.    BOWEL: No bowel obstruction. Moderate amount retained fecal material in   the distal sigmoid colon and rectum including liquid stool in the rectum   which may reflect underlying diarrhea. The appendix is mildly prominent   in caliber measuring 8 mm, however there is no surrounding inflammatory   change to suggest appendicitis. There is wall thickening versus   underdistention involving the proximal and mid stomach. No pericolonic   inflammatory changes. Nonspecific infiltration of the fat in the small   bowel mesentery in the left hemiabdomen (series 3 image 44).  PERITONEUM: No ascites.  VESSELS:  Focal ectasia of the infrarenal abdominal aorta measuring 2.5 x   2.6 cm. Mild to moderate calcified plaque in the abdominal aorta.  RETROPERITONEUM: No lymphadenopathy. Mild presacral edema.  ABDOMINAL WALL: Fat-containing umbilical and right inguinal hernias.  BONES: Osteopenic. Degenerative changes in the spine. Mild retrolisthesis   of L3 on L4. Moderate degenerative changes in both hips including joint   space narrowing and subchondral cystic change and sclerosis.    IMPRESSION:     No CT evidence of colitis.    Moderate amount retained fecal material in the distal sigmoid colon and   rectum including liquid stool in the rectum which may reflect underlying   diarrhea area    Wall thickening versus underdistention involving the proximal and mid   stomach; clinical correlation is recommended for gastritis.                TERENCE [employer]WS   This document has been electronically signed. 2019  4:46PM        < end of copied text >        MEDICATIONS  (STANDING):  allopurinol 100 milliGRAM(s) Oral daily  ampicillin  IVPB      ampicillin  IVPB 2 Gram(s) IV Intermittent every 8 hours  aspirin enteric coated 81 milliGRAM(s) Oral daily  atorvastatin 40 milliGRAM(s) Oral at bedtime  dextrose 5%. 1000 milliLiter(s) (50 mL/Hr) IV Continuous <Continuous>  dextrose 50% Injectable 12.5 Gram(s) IV Push once  dextrose 50% Injectable 25 Gram(s) IV Push once  dextrose 50% Injectable 25 Gram(s) IV Push once  furosemide    Tablet 40 milliGRAM(s) Oral daily  gabapentin 100 milliGRAM(s) Oral three times a day  insulin glargine Injectable (LANTUS) 15 Unit(s) SubCutaneous at bedtime  insulin lispro (HumaLOG) corrective regimen sliding scale   SubCutaneous three times a day before meals  insulin lispro (HumaLOG) corrective regimen sliding scale   SubCutaneous at bedtime  metoprolol succinate  milliGRAM(s) Oral daily  pantoprazole    Tablet 40 milliGRAM(s) Oral before breakfast  saccharomyces boulardii 250 milliGRAM(s) Oral two times a day  warfarin 5 milliGRAM(s) Oral once      MEDICATIONS  (PRN):  acetaminophen   Tablet .. 650 milliGRAM(s) Oral every 6 hours PRN Temp greater or equal to 38C (100.4F), Mild Pain (1 - 3), Moderate Pain (4 - 6)  dextrose 40% Gel 15 Gram(s) Oral once PRN Blood Glucose LESS THAN 70 milliGRAM(s)/deciliter  glucagon  Injectable 1 milliGRAM(s) IntraMuscular once PRN Glucose LESS THAN 70 milligrams/deciliter  traMADol 25 milliGRAM(s) Oral four times a day PRN Severe Pain (7 - 10)

## 2019-06-15 NOTE — PROGRESS NOTE ADULT - SUBJECTIVE AND OBJECTIVE BOX
NEPHROLOGY INTERVAL HPI/OVERNIGHT EVENTS:  pt continues to feel well  appetite better  no cp, sob, n/v/d  daughter at bedside as well    MEDICATIONS  (STANDING):  allopurinol 100 milliGRAM(s) Oral daily  ampicillin  IVPB      ampicillin  IVPB 2 Gram(s) IV Intermittent every 8 hours  aspirin enteric coated 81 milliGRAM(s) Oral daily  atorvastatin 40 milliGRAM(s) Oral at bedtime  dextrose 5%. 1000 milliLiter(s) (50 mL/Hr) IV Continuous <Continuous>  dextrose 50% Injectable 12.5 Gram(s) IV Push once  dextrose 50% Injectable 25 Gram(s) IV Push once  dextrose 50% Injectable 25 Gram(s) IV Push once  furosemide    Tablet 40 milliGRAM(s) Oral daily  gabapentin 100 milliGRAM(s) Oral three times a day  insulin glargine Injectable (LANTUS) 15 Unit(s) SubCutaneous at bedtime  insulin lispro (HumaLOG) corrective regimen sliding scale   SubCutaneous three times a day before meals  insulin lispro (HumaLOG) corrective regimen sliding scale   SubCutaneous at bedtime  metoprolol succinate  milliGRAM(s) Oral daily  pantoprazole    Tablet 40 milliGRAM(s) Oral before breakfast  saccharomyces boulardii 250 milliGRAM(s) Oral two times a day    MEDICATIONS  (PRN):  acetaminophen   Tablet .. 650 milliGRAM(s) Oral every 6 hours PRN Temp greater or equal to 38C (100.4F), Mild Pain (1 - 3), Moderate Pain (4 - 6)  dextrose 40% Gel 15 Gram(s) Oral once PRN Blood Glucose LESS THAN 70 milliGRAM(s)/deciliter  glucagon  Injectable 1 milliGRAM(s) IntraMuscular once PRN Glucose LESS THAN 70 milligrams/deciliter  traMADol 25 milliGRAM(s) Oral four times a day PRN Severe Pain (7 - 10)      Allergies    No Known Allergies    Intolerances              Vital Signs Last 24 Hrs  T(C): 36.9 (15 Cristian 2019 07:45), Max: 37.1 (14 Jun 2019 10:03)  T(F): 98.4 (15 Cristian 2019 07:45), Max: 98.7 (14 Jun 2019 10:03)  HR: 65 (15 Cristian 2019 07:45) (65 - 75)  BP: 163/69 (15 Cristian 2019 07:45) (134/72 - 163/69)  BP(mean): --  RR: 19 (15 Cristian 2019 07:45) (17 - 19)  SpO2: 97% (15 Cristian 2019 07:45) (95% - 97%)    PHYSICAL EXAM:  GENERAL: comfortable  NECK: Supple, No JVD/Bruit  NERVOUS SYSTEM:  A/O x3, non focal  CHEST: Clear but diminished BS at bases  HEART:  RRR, No rub  ABDOMEN: Soft, NT/ND BS+  EXTREMITIES:  No LE edema  SKIN: No rashes    LABS:                        9.5    5.1   )-----------( 134      ( 14 Jun 2019 06:32 )             29.0     06-14    x   |  x   |  x   ----------------------------<  x   x    |  x   |  2.33<H>    Ca    9.0      14 Jun 2019 06:32    TPro  6.3<L>  /  Alb  2.5<L>  /  TBili  0.5  /  DBili  x   /  AST  23  /  ALT  31  /  AlkPhos  123<H>  06-14    PT/INR - ( 15 Cristian 2019 09:09 )   PT: 33.4 sec;   INR: 2.81 ratio         PTT - ( 15 Cristian 2019 09:09 )  PTT:39.8 sec        RADIOLOGY & ADDITIONAL TESTS:

## 2019-06-15 NOTE — PROGRESS NOTE ADULT - ASSESSMENT
62 year old male with PMH HTN, dyslipidemia, ESRD on HD, DM, CAD s/p CABG, CKD3 presented with dyspnea, fever and chills.   T101.2, WBC 12.6, Lact 2.2, SCr 1.98, INR 2.63 at admission      Multifocal pneumonia with associated sepsis present upon admission. Now afebrile, normoxic.  CT chest with bilateral infiltrates RUL, RML and BRETT. Clinically improving  - Continue IV antibiotics  - Continue Supplemental O2, Nebs PRN  - Follow up repeat cultures      Enterococcus fecalis bacteremia. TTE without any vegetations CT abdomen with diarrhea in sigmoids as well as gastric thickening (which corresponds with patients history of GERD)  - Repeat culture  results pending  - Vancomycin changed to Ampicillin as sensitive  - ID follow up      CKD4  - Avoid  nephrotoxin  - Monitor SCr    AF, rate controlled, INRtherapeutic  - Continue BB  - Coumadin  5mg tonight    DMT2, A1c 9.1. Hyperglycemia  - Continue BGM and Sliding scale Insulins  - Lantus - increase to 20 Units    Diarrhea, likely antibiotic associated  - monitor BM, Lytes  - Continue Probiotics  - Consider further work up if persistent    GERD  - Continue PPI    Gout  - Continue Allopurinol    Dyslipidemia  - Continue Statin    Prophylactic measure  - INR therapeutic for VTEP  - Florastor for C. diff    Disposition - pending clinical improvement and resolution of bacteremia. ID recommendations 62 year old male with PMH HTN, dyslipidemia, ESRD on HD, DM, CAD s/p CABG, CKD3 presented with dyspnea, fever and chills.   T101.2, WBC 12.6, Lact 2.2, SCr 1.98, INR 2.63 at admission      Multifocal pneumonia with associated sepsis present upon admission. Now afebrile, normoxic.  CT chest with bilateral infiltrates RUL, RML and BRETT. Clinically improving  - Continue IV antibiotics  - Continue Supplemental O2, Nebs PRN  - Follow up repeat cultures      Enterococcus fecalis bacteremia. TTE without any vegetations CT abdomen with diarrhea in sigmoids as well as gastric thickening (which corresponds with patients history of GERD)  - Repeat culture  results pending  - Vancomycin changed to Ampicillin as sensitive  - ID follow up      CKD4  - Avoid  nephrotoxin  - Monitor SCr    AF, rate controlled, INRtherapeutic  - Continue BB  - Coumadin  5mg tonight    DMT2, A1c 9.1. Hyperglycemia  - Continue BGM and Sliding scale Insulins  - Lantus - increase to 20 Units    Diarrhea, likely antibiotic associated  - monitor BM, Lytes  - Continue Probiotics  - Consider further work up if persistent    GERD  - Continue PPI    Gout  - Continue Allopurinol    Dyslipidemia  - Continue Statin    Prophylactic measure  - INR therapeutic for VTEP  - Florastor for C. diff    Disposition - pending clinical improvement and resolution of bacteremia. ID recommendations.  Anticipate back to [facility] in next 48 hours

## 2019-06-16 LAB
ANION GAP SERPL CALC-SCNC: 12 MMOL/L — SIGNIFICANT CHANGE UP (ref 5–17)
APTT BLD: 37.2 SEC — HIGH (ref 27.5–36.3)
BUN SERPL-MCNC: 58 MG/DL — HIGH (ref 8–20)
CALCIUM SERPL-MCNC: 8.8 MG/DL — SIGNIFICANT CHANGE UP (ref 8.6–10.2)
CHLORIDE SERPL-SCNC: 95 MMOL/L — LOW (ref 98–107)
CO2 SERPL-SCNC: 25 MMOL/L — SIGNIFICANT CHANGE UP (ref 22–29)
CREAT SERPL-MCNC: 2.76 MG/DL — HIGH (ref 0.5–1.3)
GLUCOSE BLDC GLUCOMTR-MCNC: 269 MG/DL — HIGH (ref 70–99)
GLUCOSE BLDC GLUCOMTR-MCNC: 303 MG/DL — HIGH (ref 70–99)
GLUCOSE BLDC GLUCOMTR-MCNC: 420 MG/DL — HIGH (ref 70–99)
GLUCOSE BLDC GLUCOMTR-MCNC: 436 MG/DL — HIGH (ref 70–99)
GLUCOSE BLDC GLUCOMTR-MCNC: 471 MG/DL — CRITICAL HIGH (ref 70–99)
GLUCOSE SERPL-MCNC: 431 MG/DL — HIGH (ref 70–115)
HCT VFR BLD CALC: 26.1 % — LOW (ref 42–52)
HGB BLD-MCNC: 8.6 G/DL — LOW (ref 14–18)
INR BLD: 2.33 RATIO — HIGH (ref 0.88–1.16)
MCHC RBC-ENTMCNC: 28.6 PG — SIGNIFICANT CHANGE UP (ref 27–31)
MCHC RBC-ENTMCNC: 33 G/DL — SIGNIFICANT CHANGE UP (ref 32–36)
MCV RBC AUTO: 86.7 FL — SIGNIFICANT CHANGE UP (ref 80–94)
MRSA PCR RESULT.: SIGNIFICANT CHANGE UP
PLATELET # BLD AUTO: 137 K/UL — LOW (ref 150–400)
POTASSIUM SERPL-MCNC: 3.9 MMOL/L — SIGNIFICANT CHANGE UP (ref 3.5–5.3)
POTASSIUM SERPL-SCNC: 3.9 MMOL/L — SIGNIFICANT CHANGE UP (ref 3.5–5.3)
PROTHROM AB SERPL-ACNC: 27.5 SEC — HIGH (ref 10–12.9)
RBC # BLD: 3.01 M/UL — LOW (ref 4.6–6.2)
RBC # FLD: 15.8 % — HIGH (ref 11–15.6)
S AUREUS DNA NOSE QL NAA+PROBE: SIGNIFICANT CHANGE UP
SODIUM SERPL-SCNC: 132 MMOL/L — LOW (ref 135–145)
VANCOMYCIN TROUGH SERPL-MCNC: 14.3 UG/ML — SIGNIFICANT CHANGE UP (ref 10–20)
WBC # BLD: 5.3 K/UL — SIGNIFICANT CHANGE UP (ref 4.8–10.8)
WBC # FLD AUTO: 5.3 K/UL — SIGNIFICANT CHANGE UP (ref 4.8–10.8)

## 2019-06-16 PROCEDURE — 99233 SBSQ HOSP IP/OBS HIGH 50: CPT

## 2019-06-16 RX ORDER — CHLORHEXIDINE GLUCONATE 213 G/1000ML
1 SOLUTION TOPICAL DAILY
Refills: 0 | Status: DISCONTINUED | OUTPATIENT
Start: 2019-06-16 | End: 2019-06-20

## 2019-06-16 RX ORDER — INSULIN LISPRO 100/ML
5 VIAL (ML) SUBCUTANEOUS
Refills: 0 | Status: DISCONTINUED | OUTPATIENT
Start: 2019-06-16 | End: 2019-06-18

## 2019-06-16 RX ORDER — WARFARIN SODIUM 2.5 MG/1
5 TABLET ORAL ONCE
Refills: 0 | Status: COMPLETED | OUTPATIENT
Start: 2019-06-16 | End: 2019-06-16

## 2019-06-16 RX ADMIN — AMPICILLIN SODIUM AND SULBACTAM SODIUM 200 GRAM(S): 250; 125 INJECTION, POWDER, FOR SUSPENSION INTRAMUSCULAR; INTRAVENOUS at 06:32

## 2019-06-16 RX ADMIN — Medication 5 UNIT(S): at 11:32

## 2019-06-16 RX ADMIN — Medication 200 MILLIGRAM(S): at 06:25

## 2019-06-16 RX ADMIN — Medication 4: at 11:32

## 2019-06-16 RX ADMIN — AMPICILLIN SODIUM AND SULBACTAM SODIUM 200 GRAM(S): 250; 125 INJECTION, POWDER, FOR SUSPENSION INTRAMUSCULAR; INTRAVENOUS at 14:09

## 2019-06-16 RX ADMIN — Medication 250 MILLIGRAM(S): at 06:24

## 2019-06-16 RX ADMIN — PANTOPRAZOLE SODIUM 40 MILLIGRAM(S): 20 TABLET, DELAYED RELEASE ORAL at 06:24

## 2019-06-16 RX ADMIN — GABAPENTIN 100 MILLIGRAM(S): 400 CAPSULE ORAL at 14:09

## 2019-06-16 RX ADMIN — Medication 81 MILLIGRAM(S): at 11:32

## 2019-06-16 RX ADMIN — ATORVASTATIN CALCIUM 40 MILLIGRAM(S): 80 TABLET, FILM COATED ORAL at 21:21

## 2019-06-16 RX ADMIN — Medication 3: at 17:01

## 2019-06-16 RX ADMIN — CHLORHEXIDINE GLUCONATE 1 APPLICATION(S): 213 SOLUTION TOPICAL at 11:32

## 2019-06-16 RX ADMIN — Medication 100 MILLIGRAM(S): at 11:32

## 2019-06-16 RX ADMIN — INSULIN GLARGINE 15 UNIT(S): 100 INJECTION, SOLUTION SUBCUTANEOUS at 21:23

## 2019-06-16 RX ADMIN — WARFARIN SODIUM 5 MILLIGRAM(S): 2.5 TABLET ORAL at 21:21

## 2019-06-16 RX ADMIN — Medication 5 UNIT(S): at 17:01

## 2019-06-16 RX ADMIN — Medication 6: at 08:28

## 2019-06-16 RX ADMIN — GABAPENTIN 100 MILLIGRAM(S): 400 CAPSULE ORAL at 06:24

## 2019-06-16 RX ADMIN — Medication 4: at 21:21

## 2019-06-16 RX ADMIN — GABAPENTIN 100 MILLIGRAM(S): 400 CAPSULE ORAL at 21:21

## 2019-06-16 RX ADMIN — AMPICILLIN SODIUM AND SULBACTAM SODIUM 200 GRAM(S): 250; 125 INJECTION, POWDER, FOR SUSPENSION INTRAMUSCULAR; INTRAVENOUS at 21:24

## 2019-06-16 RX ADMIN — Medication 250 MILLIGRAM(S): at 17:02

## 2019-06-16 RX ADMIN — Medication 40 MILLIGRAM(S): at 06:24

## 2019-06-16 NOTE — PROGRESS NOTE ADULT - ASSESSMENT
CKD(IV)  MENDY likely due to vol depletion from diuretics  Hyponatremia (hyperglycemia noted)  PNA with bacteremia==> clinically improving  - hold diuretics today  - serum Vanco level ok  - avoid potential nephrotoxins  - needs better blood glucose control  - watch labs

## 2019-06-16 NOTE — PROGRESS NOTE ADULT - SUBJECTIVE AND OBJECTIVE BOX
HOSPITALIST PROGRESS NOTE    IFEANYI LUJAN  [id number]  79yMale    Patient is a 79y old  Male who presents with a chief complaint of SOB (16 Jun 2019 09:42)      SUBJECTIVE:   Chart reviewed since last visit.  Patient seen and examined at bedside in morning for multifocal pneumonia, enterococcus bacteremia, hyperglycemia  Denies any dyspnea, chills or chest pain.  Occasional cough      OBJECTIVE:  Vital Signs Last 24 Hrs  T(C): 38 (16 Jun 2019 16:21), Max: 38 (16 Jun 2019 16:21)  T(F): 100.4 (16 Jun 2019 16:21), Max: 100.4 (16 Jun 2019 16:21)  HR: 79 (16 Jun 2019 16:21) (75 - 79)  BP: 157/82 (16 Jun 2019 16:21) (128/61 - 157/82)   RR: 18 (16 Jun 2019 16:21) (18 - 18)  SpO2: 97% (16 Jun 2019 16:21) (94% - 97%)    PHYSICAL EXAMINATION  General: Sitting up in chair, NAD  HEENT:  extraocular movements intact, moist oral mucosa  NECK:  Supple  CVS: regular rate and rhythm   RESP:  Clear anteriorly - bibasilar crackles posteriorly (improved)  GI:  Soft nondistended nontender BS+  : No suprapubic tenderness  MSK:  FROM, trace edema  CNS:  Non focal exam  INTEG:  Warm dry skin  PSYCH:  Fair mood    MONITOR:  CAPILLARY BLOOD GLUCOSE      POCT Blood Glucose.: 420 mg/dL (16 Jun 2019 21:05)  POCT Blood Glucose.: 269 mg/dL (16 Jun 2019 16:18)  POCT Blood Glucose.: 303 mg/dL (16 Jun 2019 11:21)  POCT Blood Glucose.: 436 mg/dL (16 Jun 2019 07:56)  POCT Blood Glucose.: 471 mg/dL (16 Jun 2019 00:42)  POCT Blood Glucose.: 454 mg/dL (15 Cristian 2019 23:46)  POCT Blood Glucose.: 516 mg/dL (15 Cristian 2019 22:40)  POCT Blood Glucose.: >530 mg/dL (15 Cristian 2019 22:30)        I&O's Summary    15 Cristian 2019 07:01  -  16 Jun 2019 07:00  --------------------------------------------------------  IN: 600 mL / OUT: 1450 mL / NET: -850 mL    16 Jun 2019 07:01  -  16 Jun 2019 21:25  --------------------------------------------------------  IN: 820 mL / OUT: 800 mL / NET: 20 mL                            8.6    5.3   )-----------( 137      ( 16 Jun 2019 05:49 )             26.1   Culture - Blood in AM (06.14.19 @ 06:35)    Specimen Source: .Blood    Culture Results:   No growth at 48 hours      PT/INR - ( 16 Jun 2019 05:49 )   PT: 27.5 sec;   INR: 2.33 ratio         PTT - ( 16 Jun 2019 05:49 )  PTT:37.2 sec  06-16    132<L>  |  95<L>  |  58.0<H>  ----------------------------<  431<H>  3.9   |  25.0  |  2.76<H>    Ca    8.8      16 Jun 2019 05:49              Culture:  Culture - Blood in AM (06.14.19 @ 06:35)    Specimen Source: .Blood    Culture Results:   No growth at 48 hours    Culture - Blood in AM (06.14.19 @ 06:35)    Specimen Source: .Blood    Culture Results:   No growth at 48 hours        TTE:    RADIOLOGY        MEDICATIONS  (STANDING):  allopurinol 100 milliGRAM(s) Oral daily  ampicillin/sulbactam  IVPB 3 Gram(s) IV Intermittent every 8 hours  aspirin enteric coated 81 milliGRAM(s) Oral daily  atorvastatin 40 milliGRAM(s) Oral at bedtime  chlorhexidine 2% Cloths 1 Application(s) Topical daily  dextrose 5%. 1000 milliLiter(s) (50 mL/Hr) IV Continuous <Continuous>  dextrose 50% Injectable 12.5 Gram(s) IV Push once  dextrose 50% Injectable 25 Gram(s) IV Push once  dextrose 50% Injectable 25 Gram(s) IV Push once  gabapentin 100 milliGRAM(s) Oral three times a day  insulin glargine Injectable (LANTUS) 15 Unit(s) SubCutaneous at bedtime  insulin lispro (HumaLOG) corrective regimen sliding scale   SubCutaneous three times a day before meals  insulin lispro (HumaLOG) corrective regimen sliding scale   SubCutaneous at bedtime  insulin lispro Injectable (HumaLOG) 5 Unit(s) SubCutaneous three times a day before meals  metoprolol succinate  milliGRAM(s) Oral daily  pantoprazole    Tablet 40 milliGRAM(s) Oral before breakfast  saccharomyces boulardii 250 milliGRAM(s) Oral two times a day      MEDICATIONS  (PRN):  acetaminophen   Tablet .. 650 milliGRAM(s) Oral every 6 hours PRN Temp greater or equal to 38C (100.4F), Mild Pain (1 - 3), Moderate Pain (4 - 6)  dextrose 40% Gel 15 Gram(s) Oral once PRN Blood Glucose LESS THAN 70 milliGRAM(s)/deciliter  glucagon  Injectable 1 milliGRAM(s) IntraMuscular once PRN Glucose LESS THAN 70 milligrams/deciliter  traMADol 25 milliGRAM(s) Oral four times a day PRN Severe Pain (7 - 10)

## 2019-06-16 NOTE — PROGRESS NOTE ADULT - SUBJECTIVE AND OBJECTIVE BOX
NEPHROLOGY INTERVAL HPI/OVERNIGHT EVENTS:  pt has no complaints today  no cp, sob, n/v/d  appetite ok  serum glucoses poorly controlled    MEDICATIONS  (STANDING):  allopurinol 100 milliGRAM(s) Oral daily  ampicillin/sulbactam  IVPB 3 Gram(s) IV Intermittent every 8 hours  aspirin enteric coated 81 milliGRAM(s) Oral daily  atorvastatin 40 milliGRAM(s) Oral at bedtime  chlorhexidine 2% Cloths 1 Application(s) Topical daily  dextrose 5%. 1000 milliLiter(s) (50 mL/Hr) IV Continuous <Continuous>  dextrose 50% Injectable 12.5 Gram(s) IV Push once  dextrose 50% Injectable 25 Gram(s) IV Push once  dextrose 50% Injectable 25 Gram(s) IV Push once  furosemide    Tablet 40 milliGRAM(s) Oral daily  gabapentin 100 milliGRAM(s) Oral three times a day  insulin glargine Injectable (LANTUS) 15 Unit(s) SubCutaneous at bedtime  insulin lispro (HumaLOG) corrective regimen sliding scale   SubCutaneous three times a day before meals  insulin lispro (HumaLOG) corrective regimen sliding scale   SubCutaneous at bedtime  metoprolol succinate  milliGRAM(s) Oral daily  pantoprazole    Tablet 40 milliGRAM(s) Oral before breakfast  saccharomyces boulardii 250 milliGRAM(s) Oral two times a day    MEDICATIONS  (PRN):  acetaminophen   Tablet .. 650 milliGRAM(s) Oral every 6 hours PRN Temp greater or equal to 38C (100.4F), Mild Pain (1 - 3), Moderate Pain (4 - 6)  dextrose 40% Gel 15 Gram(s) Oral once PRN Blood Glucose LESS THAN 70 milliGRAM(s)/deciliter  glucagon  Injectable 1 milliGRAM(s) IntraMuscular once PRN Glucose LESS THAN 70 milligrams/deciliter  traMADol 25 milliGRAM(s) Oral four times a day PRN Severe Pain (7 - 10)      Allergies    No Known Allergies        Vital Signs Last 24 Hrs  T(C): 37 (16 Jun 2019 00:43), Max: 37 (15 Cristian 2019 16:32)  T(F): 98.6 (16 Jun 2019 00:43), Max: 98.6 (15 Cristian 2019 16:32)  HR: 78 (16 Jun 2019 00:43) (69 - 78)  BP: 147/73 (16 Jun 2019 00:43) (98/63 - 147/73)  BP(mean): --  RR: 18 (16 Jun 2019 00:43) (18 - 18)  SpO2: 95% (16 Jun 2019 00:43) (93% - 95%)    PHYSICAL EXAM:  GENERAL: NAD  NECK: Supple, No JVD/Bruit  NERVOUS SYSTEM:  A/O x3, non focal  CHEST: Clear; diminished BS at bases  HEART:  RRR, No rub  ABDOMEN: Soft, NT/ND BS+  EXTREMITIES: Tr ankle edema; B/L LE varicosities  SKIN: No rashes    LABS:                        8.6    5.3   )-----------( 137      ( 16 Jun 2019 05:49 )             26.1     06-16    132<L>  |  95<L>  |  58.0<H>  ----------------------------<  431<H>  3.9   |  25.0  |  2.76<H>    Creatinine, Serum: 2.06 mg/dL (06.15.19 @ 09:09)    Vancomycin Level, Trough: 14.3: Vancomycin trough levels should be rapidly reached and maintained at  15-20 ug/ml for life threatening MRSA  infections such as sepsis, endocarditis, osteomyelitis and pneumonia. A  first trough level should be drawn  before the 3rd or 4th dose.  Risk of renal toxicity is increased for levels >15 ug/ml, in patients on  other nephrotoxic drugs, who are  hemodynamically unstable, have unstable renal function, or are on  Vancomycin therapy for >14 days. Renal function with  creatinine levels should be monitored for those patients. ug/mL (06.16.19 @ 05:49)        Ca    8.8      16 Jun 2019 05:49      PT/INR - ( 16 Jun 2019 05:49 )   PT: 27.5 sec;   INR: 2.33 ratio         PTT - ( 16 Jun 2019 05:49 )  PTT:37.2 sec        RADIOLOGY & ADDITIONAL TESTS:

## 2019-06-17 ENCOUNTER — MOBILE ON CALL (OUTPATIENT)
Age: 79
End: 2019-06-17

## 2019-06-17 DIAGNOSIS — Z51.5 ENCOUNTER FOR PALLIATIVE CARE: ICD-10-CM

## 2019-06-17 DIAGNOSIS — J18.9 PNEUMONIA, UNSPECIFIED ORGANISM: ICD-10-CM

## 2019-06-17 DIAGNOSIS — M10.9 GOUT, UNSPECIFIED: ICD-10-CM

## 2019-06-17 DIAGNOSIS — R78.81 BACTEREMIA: ICD-10-CM

## 2019-06-17 LAB
ANION GAP SERPL CALC-SCNC: 11 MMOL/L — SIGNIFICANT CHANGE UP (ref 5–17)
APTT BLD: 37.4 SEC — HIGH (ref 27.5–36.3)
BUN SERPL-MCNC: 63 MG/DL — HIGH (ref 8–20)
CALCIUM SERPL-MCNC: 9.1 MG/DL — SIGNIFICANT CHANGE UP (ref 8.6–10.2)
CHLORIDE SERPL-SCNC: 97 MMOL/L — LOW (ref 98–107)
CO2 SERPL-SCNC: 26 MMOL/L — SIGNIFICANT CHANGE UP (ref 22–29)
CREAT SERPL-MCNC: 2.65 MG/DL — HIGH (ref 0.5–1.3)
GLUCOSE BLDC GLUCOMTR-MCNC: 266 MG/DL — HIGH (ref 70–99)
GLUCOSE BLDC GLUCOMTR-MCNC: 266 MG/DL — HIGH (ref 70–99)
GLUCOSE BLDC GLUCOMTR-MCNC: 351 MG/DL — HIGH (ref 70–99)
GLUCOSE BLDC GLUCOMTR-MCNC: 374 MG/DL — HIGH (ref 70–99)
GLUCOSE BLDC GLUCOMTR-MCNC: 387 MG/DL — HIGH (ref 70–99)
GLUCOSE SERPL-MCNC: 305 MG/DL — HIGH (ref 70–115)
HCT VFR BLD CALC: 28.2 % — LOW (ref 42–52)
HGB BLD-MCNC: 9.1 G/DL — LOW (ref 14–18)
INR BLD: 2.56 RATIO — HIGH (ref 0.88–1.16)
MAGNESIUM SERPL-MCNC: 1.8 MG/DL — SIGNIFICANT CHANGE UP (ref 1.8–2.6)
MCHC RBC-ENTMCNC: 28 PG — SIGNIFICANT CHANGE UP (ref 27–31)
MCHC RBC-ENTMCNC: 32.3 G/DL — SIGNIFICANT CHANGE UP (ref 32–36)
MCV RBC AUTO: 86.8 FL — SIGNIFICANT CHANGE UP (ref 80–94)
PLATELET # BLD AUTO: 132 K/UL — LOW (ref 150–400)
POTASSIUM SERPL-MCNC: 4.2 MMOL/L — SIGNIFICANT CHANGE UP (ref 3.5–5.3)
POTASSIUM SERPL-SCNC: 4.2 MMOL/L — SIGNIFICANT CHANGE UP (ref 3.5–5.3)
PROTHROM AB SERPL-ACNC: 30.3 SEC — HIGH (ref 10–12.9)
RBC # BLD: 3.25 M/UL — LOW (ref 4.6–6.2)
RBC # FLD: 16.5 % — HIGH (ref 11–15.6)
SODIUM SERPL-SCNC: 134 MMOL/L — LOW (ref 135–145)
WBC # BLD: 4.3 K/UL — LOW (ref 4.8–10.8)
WBC # FLD AUTO: 4.3 K/UL — LOW (ref 4.8–10.8)

## 2019-06-17 PROCEDURE — 99223 1ST HOSP IP/OBS HIGH 75: CPT

## 2019-06-17 PROCEDURE — 99233 SBSQ HOSP IP/OBS HIGH 50: CPT

## 2019-06-17 PROCEDURE — 99232 SBSQ HOSP IP/OBS MODERATE 35: CPT

## 2019-06-17 RX ORDER — INSULIN GLARGINE 100 [IU]/ML
18 INJECTION, SOLUTION SUBCUTANEOUS AT BEDTIME
Refills: 0 | Status: DISCONTINUED | OUTPATIENT
Start: 2019-06-17 | End: 2019-06-18

## 2019-06-17 RX ORDER — WARFARIN SODIUM 2.5 MG/1
5 TABLET ORAL ONCE
Refills: 0 | Status: COMPLETED | OUTPATIENT
Start: 2019-06-17 | End: 2019-06-17

## 2019-06-17 RX ADMIN — Medication 5 UNIT(S): at 11:35

## 2019-06-17 RX ADMIN — WARFARIN SODIUM 5 MILLIGRAM(S): 2.5 TABLET ORAL at 22:44

## 2019-06-17 RX ADMIN — Medication 3: at 11:34

## 2019-06-17 RX ADMIN — Medication 100 MILLIGRAM(S): at 11:34

## 2019-06-17 RX ADMIN — CHLORHEXIDINE GLUCONATE 1 APPLICATION(S): 213 SOLUTION TOPICAL at 11:34

## 2019-06-17 RX ADMIN — Medication 250 MILLIGRAM(S): at 05:26

## 2019-06-17 RX ADMIN — Medication 5 UNIT(S): at 17:34

## 2019-06-17 RX ADMIN — TRAMADOL HYDROCHLORIDE 25 MILLIGRAM(S): 50 TABLET ORAL at 11:20

## 2019-06-17 RX ADMIN — AMPICILLIN SODIUM AND SULBACTAM SODIUM 200 GRAM(S): 250; 125 INJECTION, POWDER, FOR SUSPENSION INTRAMUSCULAR; INTRAVENOUS at 05:25

## 2019-06-17 RX ADMIN — Medication 5: at 17:34

## 2019-06-17 RX ADMIN — GABAPENTIN 100 MILLIGRAM(S): 400 CAPSULE ORAL at 22:44

## 2019-06-17 RX ADMIN — INSULIN GLARGINE 18 UNIT(S): 100 INJECTION, SOLUTION SUBCUTANEOUS at 23:35

## 2019-06-17 RX ADMIN — ATORVASTATIN CALCIUM 40 MILLIGRAM(S): 80 TABLET, FILM COATED ORAL at 22:44

## 2019-06-17 RX ADMIN — AMPICILLIN SODIUM AND SULBACTAM SODIUM 200 GRAM(S): 250; 125 INJECTION, POWDER, FOR SUSPENSION INTRAMUSCULAR; INTRAVENOUS at 22:44

## 2019-06-17 RX ADMIN — TRAMADOL HYDROCHLORIDE 25 MILLIGRAM(S): 50 TABLET ORAL at 17:47

## 2019-06-17 RX ADMIN — PANTOPRAZOLE SODIUM 40 MILLIGRAM(S): 20 TABLET, DELAYED RELEASE ORAL at 05:27

## 2019-06-17 RX ADMIN — Medication 3: at 08:15

## 2019-06-17 RX ADMIN — TRAMADOL HYDROCHLORIDE 25 MILLIGRAM(S): 50 TABLET ORAL at 21:09

## 2019-06-17 RX ADMIN — Medication 250 MILLIGRAM(S): at 17:32

## 2019-06-17 RX ADMIN — Medication 200 MILLIGRAM(S): at 05:26

## 2019-06-17 RX ADMIN — GABAPENTIN 100 MILLIGRAM(S): 400 CAPSULE ORAL at 14:00

## 2019-06-17 RX ADMIN — AMPICILLIN SODIUM AND SULBACTAM SODIUM 200 GRAM(S): 250; 125 INJECTION, POWDER, FOR SUSPENSION INTRAMUSCULAR; INTRAVENOUS at 13:59

## 2019-06-17 RX ADMIN — GABAPENTIN 100 MILLIGRAM(S): 400 CAPSULE ORAL at 05:27

## 2019-06-17 RX ADMIN — TRAMADOL HYDROCHLORIDE 25 MILLIGRAM(S): 50 TABLET ORAL at 10:55

## 2019-06-17 RX ADMIN — Medication 3: at 22:43

## 2019-06-17 RX ADMIN — Medication 81 MILLIGRAM(S): at 11:34

## 2019-06-17 RX ADMIN — TRAMADOL HYDROCHLORIDE 25 MILLIGRAM(S): 50 TABLET ORAL at 17:32

## 2019-06-17 RX ADMIN — TRAMADOL HYDROCHLORIDE 25 MILLIGRAM(S): 50 TABLET ORAL at 20:26

## 2019-06-17 RX ADMIN — Medication 40 MILLIGRAM(S): at 17:32

## 2019-06-17 RX ADMIN — Medication 5 UNIT(S): at 08:15

## 2019-06-17 NOTE — PROGRESS NOTE ADULT - SUBJECTIVE AND OBJECTIVE BOX
HOSPITALIST PROGRESS NOTE    IFEANYI LUJAN  [id number]  79yMale    Patient is a 79y old  Male who presents with a chief complaint of SOB (17 Jun 2019 10:57)      SUBJECTIVE:   Chart reviewed since last visit.  Patient seen and examined at bedside for multifocal pneumonia, bacteremia, MENDY on CKD, Diarrhea,  Denies any dyspnea, chest pain, cough or chills.  Diarrhea resolved.  Complaining of sharp pain in R>L knee; prior hx of gout      OBJECTIVE:  Vital Signs Last 24 Hrs  T(C): 37 (17 Jun 2019 08:16), Max: 38 (16 Jun 2019 16:21)  T(F): 98.6 (17 Jun 2019 08:16), Max: 100.4 (16 Jun 2019 16:21)  HR: 61 (17 Jun 2019 08:16) (61 - 79)  BP: 149/75 (17 Jun 2019 08:16) (135/63 - 157/82)   RR: 18 (17 Jun 2019 08:16) (18 - 20)  SpO2: 95% (17 Jun 2019 08:16) (95% - 97%)    PHYSICAL EXAMINATION  General: Sitting up in chair, NAD  HEENT:  extraocular movements intact, moist oral mucosa  NECK:  Supple  CVS: regular rate and rhythm   RESP:  Clear to auscultation  GI:  Soft nondistended nontender BS+  : No suprapubic tenderness  MSK: Limited ROM secondary to pain with movement  CNS:  Non focal exam  INTEG:  Warm dry skin  PSYCH:  Fair mood        MONITOR:  CAPILLARY BLOOD GLUCOSE      POCT Blood Glucose.: 266 mg/dL (17 Jun 2019 11:09)  POCT Blood Glucose.: 266 mg/dL (17 Jun 2019 07:40)  POCT Blood Glucose.: 420 mg/dL (16 Jun 2019 21:05)  POCT Blood Glucose.: 269 mg/dL (16 Jun 2019 16:18)        I&O's Summary    16 Jun 2019 07:01  -  17 Jun 2019 07:00  --------------------------------------------------------  IN: 1300 mL / OUT: 1100 mL / NET: 200 mL    17 Jun 2019 07:01  -  17 Jun 2019 13:23  --------------------------------------------------------  IN: 400 mL / OUT: 0 mL / NET: 400 mL                            9.1    4.3   )-----------( 132      ( 17 Jun 2019 08:17 )             28.2     PT/INR - ( 17 Jun 2019 08:17 )   PT: 30.3 sec;   INR: 2.56 ratio         PTT - ( 17 Jun 2019 08:17 )  PTT:37.4 sec  06-17    134<L>  |  97<L>  |  63.0<H>  ----------------------------<  305<H>  4.2   |  26.0  |  2.65<H>    Ca    9.1      17 Jun 2019 08:17  Mg     1.8     06-17              Culture:    TTE:    RADIOLOGY        MEDICATIONS  (STANDING):  allopurinol 100 milliGRAM(s) Oral daily  ampicillin/sulbactam  IVPB 3 Gram(s) IV Intermittent every 8 hours  aspirin enteric coated 81 milliGRAM(s) Oral daily  atorvastatin 40 milliGRAM(s) Oral at bedtime  chlorhexidine 2% Cloths 1 Application(s) Topical daily  dextrose 5%. 1000 milliLiter(s) (50 mL/Hr) IV Continuous <Continuous>  dextrose 50% Injectable 12.5 Gram(s) IV Push once  dextrose 50% Injectable 25 Gram(s) IV Push once  dextrose 50% Injectable 25 Gram(s) IV Push once  gabapentin 100 milliGRAM(s) Oral three times a day  insulin glargine Injectable (LANTUS) 18 Unit(s) SubCutaneous at bedtime  insulin lispro (HumaLOG) corrective regimen sliding scale   SubCutaneous three times a day before meals  insulin lispro (HumaLOG) corrective regimen sliding scale   SubCutaneous at bedtime  insulin lispro Injectable (HumaLOG) 5 Unit(s) SubCutaneous three times a day before meals  metoprolol succinate  milliGRAM(s) Oral daily  pantoprazole    Tablet 40 milliGRAM(s) Oral before breakfast  saccharomyces boulardii 250 milliGRAM(s) Oral two times a day  warfarin 5 milliGRAM(s) Oral once      MEDICATIONS  (PRN):  acetaminophen   Tablet .. 650 milliGRAM(s) Oral every 6 hours PRN Temp greater or equal to 38C (100.4F), Mild Pain (1 - 3), Moderate Pain (4 - 6)  dextrose 40% Gel 15 Gram(s) Oral once PRN Blood Glucose LESS THAN 70 milliGRAM(s)/deciliter  glucagon  Injectable 1 milliGRAM(s) IntraMuscular once PRN Glucose LESS THAN 70 milligrams/deciliter  traMADol 25 milliGRAM(s) Oral four times a day PRN Severe Pain (7 - 10)

## 2019-06-17 NOTE — PROGRESS NOTE ADULT - ASSESSMENT
62 year old male with PMH HTN, dyslipidemia, ESRD on HD, DM, CAD s/p CABG, CKD3 presented with dyspnea, fever and chills.   T101.2, WBC 12.6, Lact 2.2, SCr 1.98, INR 2.63 at admission      Enterococcus fecalis bacteremia. TTE without any vegetations CT abdomen with diarrhea in sigmoids as well as gastric thickening (which corresponds with patients history of GERD). Repeat culture  results negative.  - Vancomycin changed to Ampicillin as sensitive  - TTE in am (discussed with Dr Nicholas)    Multifocal pneumonia with associated sepsis present upon admission. Now afebrile, normoxic.  CT chest with bilateral infiltrates RUL, RML and BRETT. Sepsis resolved, pneumonia clinically improving  - Continue antibiotics  - Continue Supplemental O2, Nebs PRN      CKD4, with MENDY (likely ATN), likely secondary to sepsis, diuretic, diarrhea  - Avoid  nephrotoxin; Lasix discontinued by Nephrology earlier today  - Monitor SCr    Acute gouty Arthritis - Right knee  - Prednisone 40 mg PO daily    AF, rate controlled, INR therapeutic  - Continue BB  - Coumadin  5mg tonight    DMT2, A1c 9.1. Hyperglycemia improving. Anticipate increase with Prednisone for gout  - Continue BGM and Sliding scale Insulins  - Lantus - increased to 18 Units  - Premeal Insulins increased to 5 Units    Diarrhea, likely antibiotic associated - resolved  - monitor BM, Lytes  - Continue Probiotics  - Consider further work up if persistent    GERD  - Continue PPI    Gout  - Continue Allopurinol    Dyslipidemia  - Continue Statin    Prophylactic measure  - INR therapeutic for VTEP  - Florastor for C. diff    Disposition - pending ABELINO, MENDY and Gout improvement. Anticipate back to [facility] in next 48 hours

## 2019-06-17 NOTE — CONSULT NOTE ADULT - PROBLEM SELECTOR RECOMMENDATION 6
assured pt his pain would be addressed which would ultimately allow him to ambulate again which is his main goal, spoke to patient about MOLST in chart (full code, unlimited interventions), he deferred to his granddaughter Erin who is "in charge of his care", called and left a message for her to discuss his goals and rehospitalizations as of lately - await a call back to discuss further.

## 2019-06-17 NOTE — PROGRESS NOTE ADULT - ASSESSMENT
80 y/o male with h/o A Fib on coumadin CHF, Gout and arthritis who was admitted to [facility] about 9 days ago for incapacitation and unable to ambulate. patient developed SOB and chills this morning. no cough. no headache nausea or vomiting. no diarrhea or dysuria but frequent urination. In the ER, Temp: 101.2. O2 was 87, improved with O2 NC . CXR showed right upper and lower pneumonia.   PT WITH BLOOD CX ENTEROCOCCAL BACTEREMIA   on  AMPICILLIN   ECHO NEGATIVE BUT   RECOMMEND  ABELINO         CT ABD PELVIS DONE   POSSIBLE PNEUMONIA WILL ON     UNASYN    IF ENDOCARDITIS  WOULD NEED  AMP/ROCEPHIN TX   WILL FOLLOW UP D/W HOSPITLSIT  SPOKE TO DAUGHTER AT BEDSIDE

## 2019-06-17 NOTE — PROGRESS NOTE ADULT - ASSESSMENT
CKD(IV):  MENDY likely due to vol depletion from diuretics==> improved  Hyponatremia (hyperglycemia noted)==> improved  PNA with bacteremia resolving  - cont to hold diuretics  - avoid potential nephrotoxins  - needs serum glucose optimization  - watch labs

## 2019-06-17 NOTE — CONSULT NOTE ADULT - SUBJECTIVE AND OBJECTIVE BOX
CHIEF COMPLAINT:SOB    HPI: Patient is a  79y Male with chronic AF, HTN, CKD, gout, HLD here with shortness of breath. Was at [facility] and developed fevers/chills. No cp/pnd/orthopnea/palps/syncope. Imaging showed PNA (right greater than left infiltrate on CT) and put on IV antibiotics. Also found to be bactermemic. Breathing better and no CP, but significant right knee pain and with gout flare.     PAST MEDICAL & SURGICAL HISTORY:  Gout  Diabetes  CRI (chronic renal insufficiency)  Chronic CHF  Atrial fibrillation  Heart murmur  Valvular heart disease  Iron deficiency anemia  Gastroesophageal reflux disease without esophagitis  Hyperlipidemia  Essential hypertension  Transient cerebral ischemia, unspecified transient cerebral ischemia type  No significant past surgical history      MEDICATIONS:  MEDICATIONS  (STANDING):  allopurinol 100 milliGRAM(s) Oral daily  ampicillin/sulbactam  IVPB 3 Gram(s) IV Intermittent every 8 hours  aspirin enteric coated 81 milliGRAM(s) Oral daily  atorvastatin 40 milliGRAM(s) Oral at bedtime  chlorhexidine 2% Cloths 1 Application(s) Topical daily  dextrose 5%. 1000 milliLiter(s) (50 mL/Hr) IV Continuous <Continuous>  dextrose 50% Injectable 12.5 Gram(s) IV Push once  dextrose 50% Injectable 25 Gram(s) IV Push once  dextrose 50% Injectable 25 Gram(s) IV Push once  gabapentin 100 milliGRAM(s) Oral three times a day  insulin glargine Injectable (LANTUS) 18 Unit(s) SubCutaneous at bedtime  insulin lispro (HumaLOG) corrective regimen sliding scale   SubCutaneous three times a day before meals  insulin lispro (HumaLOG) corrective regimen sliding scale   SubCutaneous at bedtime  insulin lispro Injectable (HumaLOG) 5 Unit(s) SubCutaneous three times a day before meals  metoprolol succinate  milliGRAM(s) Oral daily  pantoprazole    Tablet 40 milliGRAM(s) Oral before breakfast  predniSONE   Tablet 40 milliGRAM(s) Oral daily  saccharomyces boulardii 250 milliGRAM(s) Oral two times a day  warfarin 5 milliGRAM(s) Oral once    MEDICATIONS  (PRN):  acetaminophen   Tablet .. 650 milliGRAM(s) Oral every 6 hours PRN Temp greater or equal to 38C (100.4F), Mild Pain (1 - 3), Moderate Pain (4 - 6)  dextrose 40% Gel 15 Gram(s) Oral once PRN Blood Glucose LESS THAN 70 milliGRAM(s)/deciliter  glucagon  Injectable 1 milliGRAM(s) IntraMuscular once PRN Glucose LESS THAN 70 milligrams/deciliter  traMADol 25 milliGRAM(s) Oral four times a day PRN Severe Pain (7 - 10)    acetaminophen   Tablet .. 650 milliGRAM(s) Oral every 6 hours PRN  allopurinol 100 milliGRAM(s) Oral daily  ampicillin/sulbactam  IVPB 3 Gram(s) IV Intermittent every 8 hours  aspirin enteric coated 81 milliGRAM(s) Oral daily  atorvastatin 40 milliGRAM(s) Oral at bedtime  chlorhexidine 2% Cloths 1 Application(s) Topical daily  dextrose 40% Gel 15 Gram(s) Oral once PRN  dextrose 5%. 1000 milliLiter(s) IV Continuous <Continuous>  dextrose 50% Injectable 12.5 Gram(s) IV Push once  dextrose 50% Injectable 25 Gram(s) IV Push once  dextrose 50% Injectable 25 Gram(s) IV Push once  gabapentin 100 milliGRAM(s) Oral three times a day  glucagon  Injectable 1 milliGRAM(s) IntraMuscular once PRN  insulin glargine Injectable (LANTUS) 18 Unit(s) SubCutaneous at bedtime  insulin lispro (HumaLOG) corrective regimen sliding scale   SubCutaneous three times a day before meals  insulin lispro (HumaLOG) corrective regimen sliding scale   SubCutaneous at bedtime  insulin lispro Injectable (HumaLOG) 5 Unit(s) SubCutaneous three times a day before meals  metoprolol succinate  milliGRAM(s) Oral daily  pantoprazole    Tablet 40 milliGRAM(s) Oral before breakfast  predniSONE   Tablet 40 milliGRAM(s) Oral daily  saccharomyces boulardii 250 milliGRAM(s) Oral two times a day  traMADol 25 milliGRAM(s) Oral four times a day PRN  warfarin 5 milliGRAM(s) Oral once      FAMILY HISTORY:  FAMILY HISTORY: non-contributory to this case      SOCIAL HISTORY: no EtOH, drugs or tobacco    ROS: GI negative, All others negative    PHYSCIAL EXAM:  Vital Signs Last 24 Hrs  T(C): 37 (17 Jun 2019 08:16), Max: 38 (16 Jun 2019 16:21)  T(F): 98.6 (17 Jun 2019 08:16), Max: 100.4 (16 Jun 2019 16:21)  HR: 61 (17 Jun 2019 08:16) (61 - 79)  BP: 149/75 (17 Jun 2019 08:16) (135/63 - 157/82)  BP(mean): --  RR: 18 (17 Jun 2019 08:16) (18 - 20)  SpO2: 95% (17 Jun 2019 08:16) (95% - 97%)  I&O's Summary    16 Jun 2019 07:01  -  17 Jun 2019 07:00  --------------------------------------------------------  IN: 1300 mL / OUT: 1100 mL / NET: 200 mL    17 Jun 2019 07:01  -  17 Jun 2019 15:07  --------------------------------------------------------  IN: 1760 mL / OUT: 100 mL / NET: 1660 mL      GEN: NAD  HEENT: MMM, sclera anicteric  RESP: CTA bilaterally  CVS: S1S2, irregular, no JVD, no M/R/G  GI: Soft, NT, ND, BS+  EXT: no C/C/E, ice noted on right knee  NEURO: AAOX3  PSYCH: Normal affect    ECG: AF, NSST     LABS:                        9.1    4.3   )-----------( 132      ( 17 Jun 2019 08:17 )             28.2     06-17    134<L>  |  97<L>  |  63.0<H>  ----------------------------<  305<H>  4.2   |  26.0  |  2.65<H>    Ca    9.1      17 Jun 2019 08:17  Mg     1.8     06-17          PT/INR - ( 17 Jun 2019 08:17 )   PT: 30.3 sec;   INR: 2.56 ratio         PTT - ( 17 Jun 2019 08:17 )  PTT:37.4 sec      RADIOLOGY & ADDITIONAL STUDIES:    Assessment:     79y Male with chronic AF, HTN, CKD, gout, HLD here with shortness of breath due to PNA. Has enterococcus bateremia, ? GI source. Agree with need for ABELINO to ensure no evidence endocarditis. TTE fairly unremarkable and no gross evidence of vegetation.     Plan:  1. NPO after midnight for ABELINO tomorrow  2. Antibiotics per ID  3. Rate control and A/C for AF  4. thank you      Jaren Nicholas MD

## 2019-06-17 NOTE — PROGRESS NOTE ADULT - SUBJECTIVE AND OBJECTIVE BOX
NEPHROLOGY INTERVAL HPI/OVERNIGHT EVENTS:  pt clinically stable  no acute distress noted  no cp, sob, n/v/d    MEDICATIONS  (STANDING):  allopurinol 100 milliGRAM(s) Oral daily  ampicillin/sulbactam  IVPB 3 Gram(s) IV Intermittent every 8 hours  aspirin enteric coated 81 milliGRAM(s) Oral daily  atorvastatin 40 milliGRAM(s) Oral at bedtime  chlorhexidine 2% Cloths 1 Application(s) Topical daily  dextrose 5%. 1000 milliLiter(s) (50 mL/Hr) IV Continuous <Continuous>  dextrose 50% Injectable 12.5 Gram(s) IV Push once  dextrose 50% Injectable 25 Gram(s) IV Push once  dextrose 50% Injectable 25 Gram(s) IV Push once  gabapentin 100 milliGRAM(s) Oral three times a day  insulin glargine Injectable (LANTUS) 18 Unit(s) SubCutaneous at bedtime  insulin lispro (HumaLOG) corrective regimen sliding scale   SubCutaneous three times a day before meals  insulin lispro (HumaLOG) corrective regimen sliding scale   SubCutaneous at bedtime  insulin lispro Injectable (HumaLOG) 5 Unit(s) SubCutaneous three times a day before meals  metoprolol succinate  milliGRAM(s) Oral daily  pantoprazole    Tablet 40 milliGRAM(s) Oral before breakfast  saccharomyces boulardii 250 milliGRAM(s) Oral two times a day  warfarin 5 milliGRAM(s) Oral once    MEDICATIONS  (PRN):  acetaminophen   Tablet .. 650 milliGRAM(s) Oral every 6 hours PRN Temp greater or equal to 38C (100.4F), Mild Pain (1 - 3), Moderate Pain (4 - 6)  dextrose 40% Gel 15 Gram(s) Oral once PRN Blood Glucose LESS THAN 70 milliGRAM(s)/deciliter  glucagon  Injectable 1 milliGRAM(s) IntraMuscular once PRN Glucose LESS THAN 70 milligrams/deciliter  traMADol 25 milliGRAM(s) Oral four times a day PRN Severe Pain (7 - 10)      Allergies    No Known Allergies          Vital Signs Last 24 Hrs  T(C): 37 (17 Jun 2019 08:16), Max: 38 (16 Jun 2019 16:21)  T(F): 98.6 (17 Jun 2019 08:16), Max: 100.4 (16 Jun 2019 16:21)  HR: 61 (17 Jun 2019 08:16) (61 - 79)  BP: 149/75 (17 Jun 2019 08:16) (135/63 - 157/82)  BP(mean): --  RR: 18 (17 Jun 2019 08:16) (18 - 20)  SpO2: 95% (17 Jun 2019 08:16) (95% - 97%)    PHYSICAL EXAM:  GENERAL: comfortable  NECK: Supple, No JVD/Bruit  NERVOUS SYSTEM:  A/O x3, non focal  CHEST: Clear; diminished BS at bases  HEART:  RRR, No rub  ABDOMEN: Soft, NT/ND BS+  EXTREMITIES: Tr ankle edema; B/L LE varicosities  SKIN: No rashes    LABS:                        9.1    4.3   )-----------( 132      ( 17 Jun 2019 08:17 )             28.2     06-17    134<L>  |  97<L>  |  63.0<H>  ----------------------------<  305<H>  4.2   |  26.0  |  2.65<H>    Creatinine, Serum: 2.76 mg/dL (06.16.19 @ 05:49)    Sodium, Serum: 132 mmol/L (06.16.19 @ 05:49)        Ca    9.1      17 Jun 2019 08:17  Mg     1.8     06-17      PT/INR - ( 17 Jun 2019 08:17 )   PT: 30.3 sec;   INR: 2.56 ratio         PTT - ( 17 Jun 2019 08:17 )  PTT:37.4 sec    Magnesium, Serum: 1.8 mg/dL (06-17 @ 08:17)      RADIOLOGY & ADDITIONAL TESTS:

## 2019-06-17 NOTE — PROGRESS NOTE ADULT - SUBJECTIVE AND OBJECTIVE BOX
[facility]  INFECTIOUS DISEASES AND INTERNAL MEDICINE at [place]  =======================================================  Francisco Gomez MD  Diplomates American Board of Internal Medicine and Infectious Diseases  =======================================================    TAI IFEANYI [id number]    Follow up: enterococcal bacteremia    Allergies:  No Known Allergies      Medications:  acetaminophen   Tablet .. 650 milliGRAM(s) Oral every 6 hours PRN  allopurinol 100 milliGRAM(s) Oral daily  ampicillin  IVPB      ampicillin  IVPB 2 Gram(s) IV Intermittent every 8 hours  aspirin enteric coated 81 milliGRAM(s) Oral daily  atorvastatin 40 milliGRAM(s) Oral at bedtime  dextrose 40% Gel 15 Gram(s) Oral once PRN  dextrose 5%. 1000 milliLiter(s) IV Continuous <Continuous>  dextrose 50% Injectable 12.5 Gram(s) IV Push once  dextrose 50% Injectable 25 Gram(s) IV Push once  dextrose 50% Injectable 25 Gram(s) IV Push once  furosemide    Tablet 40 milliGRAM(s) Oral daily  gabapentin 100 milliGRAM(s) Oral three times a day  glucagon  Injectable 1 milliGRAM(s) IntraMuscular once PRN  insulin glargine Injectable (LANTUS) 15 Unit(s) SubCutaneous at bedtime  insulin lispro (HumaLOG) corrective regimen sliding scale   SubCutaneous three times a day before meals  insulin lispro (HumaLOG) corrective regimen sliding scale   SubCutaneous at bedtime  metoprolol succinate  milliGRAM(s) Oral daily  pantoprazole    Tablet 40 milliGRAM(s) Oral before breakfast  saccharomyces boulardii 250 milliGRAM(s) Oral two times a day  traMADol 25 milliGRAM(s) Oral four times a day PRN  warfarin 5 milliGRAM(s) Oral once    SOCIAL       FAMILY   FAMILY HISTORY:    REVIEW OF SYSTEMS:  CONSTITUTIONAL:  No Fever or chills  HEENT:   No diplopia or blurred vision.  No earache, sore throat or runny nose.  CARDIOVASCULAR:  No pressure, squeezing, strangling, tightness, heaviness or aching about the chest, neck, axilla or epigastrium.  RESPIRATORY:  No cough, shortness of breath, PND or orthopnea.  GASTROINTESTINAL:  No nausea, vomiting or diarrhea.  GENITOURINARY:  No dysuria, frequency or urgency. No Blood in urine  MUSCULOSKELETAL:   AS PER HPI  SKIN:  No change in skin, hair or nails.  NEUROLOGIC:  No paresthesias, fasciculations, seizures or weakness.  PSYCHIATRIC:  No disorder of thought or mood.  ENDOCRINE:  No heat or cold intolerance, polyuria or polydipsia.  HEMATOLOGICAL:  No easy bruising or bleeding.            Physical Exam:   Vital Signs Last 24 Hrs  T(C): 37 (17 Jun 2019 08:16), Max: 38 (16 Jun 2019 16:21)  T(F): 98.6 (17 Jun 2019 08:16), Max: 100.4 (16 Jun 2019 16:21)  HR: 61 (17 Jun 2019 08:16) (61 - 79)  BP: 149/75 (17 Jun 2019 08:16) (135/63 - 157/82)  BP(mean): --  RR: 18 (17 Jun 2019 08:16) (18 - 20)  SpO2: 95% (17 Jun 2019 08:16) (95% - 97%)    GEN: NAD, pleasant  HEENT: normocephalic and atraumatic. EOMI. JOSE.    NECK: Supple. No carotid bruits.  No lymphadenopathy or thyromegaly.  LUNGS: Clear to auscultation.  HEART: Regular rate and rhythm without murmur.  ABDOMEN: Soft, nontender, and nondistended.  Positive bowel sounds.    : No CVA tenderness  EXTREMITIES: Without any cyanosis, clubbing, rash, lesions or edema.  MSK: no joint swelling  NEUROLOGIC: Cranial nerves II through XII are grossly intact.  PSYCHIATRIC: Appropriate affect .  SKIN: No ulceration or induration present.        Labs:                          9.1    4.3   )-----------( 132      ( 17 Jun 2019 08:17 )             28.2   06-17    134<L>  |  97<L>  |  63.0<H>  ----------------------------<  305<H>  4.2   |  26.0  |  2.65<H>    Ca    9.1      17 Jun 2019 08:17  Mg     1.8     06-17        POCT Blood Glucose.: 433 mg/dL (15 Cristian 2019 11:08)  POCT Blood Glucose.: 347 mg/dL (15 Cristian 2019 08:02)  POCT Blood Glucose.: 408 mg/dL (15 Cristian 2019 00:25)  POCT Blood Glucose.: 419 mg/dL (14 Jun 2019 17:02)        RECENT CULTURES:  06-12 @ 09:23 .Blood Enterococcus faecalis  Blood Culture PCR    Growth in aerobic and anaerobic bottles: Enterococcus faecalis  Aerobic Bottle: 11.56 Hours to positivity  Anaerobic Bottle: 13:26 Hours to positivity  .  ***Blood Panel PCR results on this specimen are available  approximately 3 hours after the Gram stain result.***  Gram stain, PCR, and/or culture results may not always  correspond due to difference in methodologies.  ************************************************************  This PCR assay was performed using [employer].  The following targets are tested for: Enterococcus,  vancomycin resistant enterococci, Listeria monocytogenes,  coagulase negative staphylococci, S. aureus,  methicillin resistant S. aureus, Streptococcus agalactiae  (Group B), S. pneumoniae, S. pyogenes (Group A),  Acinetobacter baumannii, Enterobacter cloacae, E. coli,  Klebsiella oxytoca, K. pneumoniae, Proteus sp.,  Serratia marcescens, Haemophilus influenzae,  Neisseria meningitidis, Pseudomonas aeruginosa, Candida  albicans, C. glabrata, C krusei, C parapsilosis,  C. tropicalis and the KPC resistance gene.  "Due to technical problems, Proteus sp. will Not be reported as part of  the BCID panel until further notice"  .  TYPE: (C=Critical, N=Notification, A=Abnormal) C  TESTS:  _ GS BLD  DATE/TIME CALLED: _ 06/12/2019 22:53:24  CALLED TO: Maria Dolores RANGEL  READ BACK (2 Patient Identifiers)(Y/N): _ Y  READ BACK VALUES (Y/N): _ Y  CALLED BY: Maria Dolores BACK        06-12 @ 09:22 .Blood Enterococcus faecalis    Growth in aerobic and anaerobic bottles: Enterococcus faecalis  Aerobic Bottle: 12:26 Hours to positivity  Anaerobic Bottle: 12:36 Hours to positivity  .  TYPE: (C=Critical, N=Notification, A=Abnormal) C  TESTS:  _ Positive Blood GS  DATE/TIME CALLED: _ 06/13/2019 09:50  CALLED TO: _ 4TWR: Maude Matias RN  READ BACK (2 Patient Identifiers)(Y/N): _ Y  READ BACK VALUES (Y/N): _ Y  CALLED BY: Maria Dolores patricia        06-12 @ 09:20 .Urine     No growth

## 2019-06-17 NOTE — CONSULT NOTE ADULT - SUBJECTIVE AND OBJECTIVE BOX
HPI:    PERTINENT PMH REVIEWED: Yes No    PAST MEDICAL & SURGICAL HISTORY:  Gout  Diabetes  CRI (chronic renal insufficiency)  Chronic CHF  Atrial fibrillation  Heart murmur  Valvular heart disease  Iron deficiency anemia  Gastroesophageal reflux disease without esophagitis  Hyperlipidemia  Essential hypertension  Transient cerebral ischemia, unspecified transient cerebral ischemia type  No significant past surgical history      SOCIAL HISTORY:                                     Admitted from:  home  SNF  [facility]     Surrogate/HCP/Guardian: Phone#:    FAMILY HISTORY:      Baseline ADLs (prior to admission):  Independent/ Dependent      Allergies    No Known Allergies    Intolerances        Present Symptoms:     Dyspnea: 0 1 2 3   Nausea/Vomiting: Yes No  Anxiety:  Yes No  Depression: Yes No  Fatigue: Yes No  Loss of appetite: Yes No    Pain:             Character-            Duration-            Effect-            Factors-            Frequency-            Location-            Severity-    Review of Systems: Reviewed                     Negative:                     Positive:  Unable to obtain due to poor mentation   All others negative    MEDICATIONS  (STANDING):  allopurinol 100 milliGRAM(s) Oral daily  ampicillin/sulbactam  IVPB 3 Gram(s) IV Intermittent every 8 hours  aspirin enteric coated 81 milliGRAM(s) Oral daily  atorvastatin 40 milliGRAM(s) Oral at bedtime  chlorhexidine 2% Cloths 1 Application(s) Topical daily  dextrose 5%. 1000 milliLiter(s) (50 mL/Hr) IV Continuous <Continuous>  dextrose 50% Injectable 12.5 Gram(s) IV Push once  dextrose 50% Injectable 25 Gram(s) IV Push once  dextrose 50% Injectable 25 Gram(s) IV Push once  gabapentin 100 milliGRAM(s) Oral three times a day  insulin glargine Injectable (LANTUS) 18 Unit(s) SubCutaneous at bedtime  insulin lispro (HumaLOG) corrective regimen sliding scale   SubCutaneous three times a day before meals  insulin lispro (HumaLOG) corrective regimen sliding scale   SubCutaneous at bedtime  insulin lispro Injectable (HumaLOG) 5 Unit(s) SubCutaneous three times a day before meals  metoprolol succinate  milliGRAM(s) Oral daily  pantoprazole    Tablet 40 milliGRAM(s) Oral before breakfast  saccharomyces boulardii 250 milliGRAM(s) Oral two times a day  warfarin 5 milliGRAM(s) Oral once    MEDICATIONS  (PRN):  acetaminophen   Tablet .. 650 milliGRAM(s) Oral every 6 hours PRN Temp greater or equal to 38C (100.4F), Mild Pain (1 - 3), Moderate Pain (4 - 6)  dextrose 40% Gel 15 Gram(s) Oral once PRN Blood Glucose LESS THAN 70 milliGRAM(s)/deciliter  glucagon  Injectable 1 milliGRAM(s) IntraMuscular once PRN Glucose LESS THAN 70 milligrams/deciliter  traMADol 25 milliGRAM(s) Oral four times a day PRN Severe Pain (7 - 10)      PHYSICAL EXAM:    Vital Signs Last 24 Hrs  T(C): 37 (17 Jun 2019 08:16), Max: 38 (16 Jun 2019 16:21)  T(F): 98.6 (17 Jun 2019 08:16), Max: 100.4 (16 Jun 2019 16:21)  HR: 61 (17 Jun 2019 08:16) (61 - 79)  BP: 149/75 (17 Jun 2019 08:16) (135/63 - 157/82)  BP(mean): --  RR: 18 (17 Jun 2019 08:16) (18 - 20)  SpO2: 95% (17 Jun 2019 08:16) (95% - 97%)    General: alert  oriented x ____ lethargic agitated                  cachexia  nonverbal  coma    Karnofsky:  %    HEENT: normal  dry mouth  ET tube/trach    Lungs: comfortable tachypnea/labored breathing  excessive secretions    CV: normal  tachycardia    GI: normal  distended  tender  no BS               PEG/NG/OG tube  constipation  last BM:     : normal  incontinent  oliguria/anuria  aguilar    MSK: normal  weakness  edema             ambulatory  bedbound/wheelchair bound    Skin: normal  pressure ulcers- Stage_____  no rash    LABS:                        9.1    4.3   )-----------( 132      ( 17 Jun 2019 08:17 )             28.2     06-17    134<L>  |  97<L>  |  63.0<H>  ----------------------------<  305<H>  4.2   |  26.0  |  2.65<H>    Ca    9.1      17 Jun 2019 08:17  Mg     1.8     06-17      PT/INR - ( 17 Jun 2019 08:17 )   PT: 30.3 sec;   INR: 2.56 ratio         PTT - ( 17 Jun 2019 08:17 )  PTT:37.4 sec    I&O's Summary    16 Jun 2019 07:01  -  17 Jun 2019 07:00  --------------------------------------------------------  IN: 1300 mL / OUT: 1100 mL / NET: 200 mL    17 Jun 2019 07:01  -  17 Jun 2019 13:28  --------------------------------------------------------  IN: 400 mL / OUT: 0 mL / NET: 400 mL        RADIOLOGY & ADDITIONAL STUDIES:    ADVANCE DIRECTIVES:   DNR YES NO  Completed on:                     DEANDRE  YES NO   Completed on:  Living Will  YES NO   Completed on: HPI: 80 y/o male with h/o A Fib on coumadin CHF, Gout and arthritis who [facility] hospitalization at [facility] as he was unable to ambulate. He was sent to [facility] from [facility] for fever and found to have enterococcal bacteremia, multifocal PNA. Hospital course complicated by hyperglycemia, MENDY on CKD and now gouty flare.     PERTINENT PMH REVIEWED: Yes    PAST MEDICAL & SURGICAL HISTORY:  Gout  Diabetes  CRI (chronic renal insufficiency)  Chronic CHF  Atrial fibrillation  Heart murmur  Valvular heart disease  Iron deficiency anemia  Gastroesophageal reflux disease without esophagitis  Hyperlipidemia  Essential hypertension  Transient cerebral ischemia, unspecified transient cerebral ischemia type  No significant past surgical history      SOCIAL HISTORY:                                     Admitted from:   [facility]     Surrogate/HCP/Guardian: Phone#: Erin Garcia     FAMILY HISTORY:      Baseline ADLs (prior to admission):  Dependent      Allergies    No Known Allergies    Intolerances        Present Symptoms:     Dyspnea: 0   Nausea/Vomiting: No  Anxiety:  Yes No  Depression: Yes No  Fatigue: Yes No  Loss of appetite: Yes No    Pain:             Character-            Duration-            Effect-            Factors-            Frequency-            Location-            Severity-    Review of Systems: Reviewed                     Negative:                     Positive:  Unable to obtain due to poor mentation   All others negative    MEDICATIONS  (STANDING):  allopurinol 100 milliGRAM(s) Oral daily  ampicillin/sulbactam  IVPB 3 Gram(s) IV Intermittent every 8 hours  aspirin enteric coated 81 milliGRAM(s) Oral daily  atorvastatin 40 milliGRAM(s) Oral at bedtime  chlorhexidine 2% Cloths 1 Application(s) Topical daily  dextrose 5%. 1000 milliLiter(s) (50 mL/Hr) IV Continuous <Continuous>  dextrose 50% Injectable 12.5 Gram(s) IV Push once  dextrose 50% Injectable 25 Gram(s) IV Push once  dextrose 50% Injectable 25 Gram(s) IV Push once  gabapentin 100 milliGRAM(s) Oral three times a day  insulin glargine Injectable (LANTUS) 18 Unit(s) SubCutaneous at bedtime  insulin lispro (HumaLOG) corrective regimen sliding scale   SubCutaneous three times a day before meals  insulin lispro (HumaLOG) corrective regimen sliding scale   SubCutaneous at bedtime  insulin lispro Injectable (HumaLOG) 5 Unit(s) SubCutaneous three times a day before meals  metoprolol succinate  milliGRAM(s) Oral daily  pantoprazole    Tablet 40 milliGRAM(s) Oral before breakfast  saccharomyces boulardii 250 milliGRAM(s) Oral two times a day  warfarin 5 milliGRAM(s) Oral once    MEDICATIONS  (PRN):  acetaminophen   Tablet .. 650 milliGRAM(s) Oral every 6 hours PRN Temp greater or equal to 38C (100.4F), Mild Pain (1 - 3), Moderate Pain (4 - 6)  dextrose 40% Gel 15 Gram(s) Oral once PRN Blood Glucose LESS THAN 70 milliGRAM(s)/deciliter  glucagon  Injectable 1 milliGRAM(s) IntraMuscular once PRN Glucose LESS THAN 70 milligrams/deciliter  traMADol 25 milliGRAM(s) Oral four times a day PRN Severe Pain (7 - 10)      PHYSICAL EXAM:    Vital Signs Last 24 Hrs  T(C): 37 (17 Jun 2019 08:16), Max: 38 (16 Jun 2019 16:21)  T(F): 98.6 (17 Jun 2019 08:16), Max: 100.4 (16 Jun 2019 16:21)  HR: 61 (17 Jun 2019 08:16) (61 - 79)  BP: 149/75 (17 Jun 2019 08:16) (135/63 - 157/82)  BP(mean): --  RR: 18 (17 Jun 2019 08:16) (18 - 20)  SpO2: 95% (17 Jun 2019 08:16) (95% - 97%)    General: alert  oriented x ____ lethargic agitated                  cachexia  nonverbal  coma    Karnofsky:  %    HEENT: normal  dry mouth  ET tube/trach    Lungs: comfortable tachypnea/labored breathing  excessive secretions    CV: normal  tachycardia    GI: normal  distended  tender  no BS               PEG/NG/OG tube  constipation  last BM:     : normal  incontinent  oliguria/anuria  aguilar    MSK: normal  weakness  edema             ambulatory  bedbound/wheelchair bound    Skin: normal  pressure ulcers- Stage_____  no rash    LABS:                        9.1    4.3   )-----------( 132      ( 17 Jun 2019 08:17 )             28.2     06-17    134<L>  |  97<L>  |  63.0<H>  ----------------------------<  305<H>  4.2   |  26.0  |  2.65<H>    Ca    9.1      17 Jun 2019 08:17  Mg     1.8     06-17      PT/INR - ( 17 Jun 2019 08:17 )   PT: 30.3 sec;   INR: 2.56 ratio         PTT - ( 17 Jun 2019 08:17 )  PTT:37.4 sec    I&O's Summary    16 Jun 2019 07:01  -  17 Jun 2019 07:00  --------------------------------------------------------  IN: 1300 mL / OUT: 1100 mL / NET: 200 mL    17 Jun 2019 07:01  -  17 Jun 2019 13:28  --------------------------------------------------------  IN: 400 mL / OUT: 0 mL / NET: 400 mL        RADIOLOGY & ADDITIONAL STUDIES:    ADVANCE DIRECTIVES:   DNR YES NO  Completed on:                     MARGARITOST  YES NO   Completed on:  Living Will  YES NO   Completed on: HPI: 78 y/o male with h/o A Fib on coumadin CHF, Gout and arthritis who was admitted to [facility] after hospitalization at [facility] as he was unable to ambulate. He was sent to [facility] from [facility] for fever and found to have enterococcal bacteremia, multifocal PNA. Hospital course complicated by hyperglycemia, MENDY on CKD and now gouty flare.     PERTINENT PMH REVIEWED: Yes    PAST MEDICAL & SURGICAL HISTORY:  Gout  Diabetes  CRI (chronic renal insufficiency)  Chronic CHF  Atrial fibrillation  Heart murmur  Valvular heart disease  Iron deficiency anemia  Gastroesophageal reflux disease without esophagitis  Hyperlipidemia  Essential hypertension  Transient cerebral ischemia, unspecified transient cerebral ischemia type  No significant past surgical history      SOCIAL HISTORY:                                     Admitted from:   [facility]     Surrogate/HCP/Guardian: Phone#: Erin Garcia     FAMILY HISTORY:      Baseline ADLs (prior to admission):  Dependent  -not walking for 1 month prior to this hospitalization- pt was hospitalized at [facility] prior to going to [facility]     Allergies    No Known Allergies    Intolerances        Present Symptoms:     Dyspnea: 0   Nausea/Vomiting: No  Anxiety:  Yes No  Depression: Yes No  Fatigue: Yes No  Loss of appetite: Yes No    Pain:             Character- knife like stabbing            Duration- constant            Effect- immobilizing            Factors-worse w movement             Frequency- constant             Location-both knees R>L            Severity-  R 10, L 6     Review of Systems: Reviewed                     Negative:                     Positive: pain in knees as described above   All others negative    MEDICATIONS  (STANDING):  allopurinol 100 milliGRAM(s) Oral daily  ampicillin/sulbactam  IVPB 3 Gram(s) IV Intermittent every 8 hours  aspirin enteric coated 81 milliGRAM(s) Oral daily  atorvastatin 40 milliGRAM(s) Oral at bedtime  chlorhexidine 2% Cloths 1 Application(s) Topical daily  dextrose 5%. 1000 milliLiter(s) (50 mL/Hr) IV Continuous <Continuous>  dextrose 50% Injectable 12.5 Gram(s) IV Push once  dextrose 50% Injectable 25 Gram(s) IV Push once  dextrose 50% Injectable 25 Gram(s) IV Push once  gabapentin 100 milliGRAM(s) Oral three times a day  insulin glargine Injectable (LANTUS) 18 Unit(s) SubCutaneous at bedtime  insulin lispro (HumaLOG) corrective regimen sliding scale   SubCutaneous three times a day before meals  insulin lispro (HumaLOG) corrective regimen sliding scale   SubCutaneous at bedtime  insulin lispro Injectable (HumaLOG) 5 Unit(s) SubCutaneous three times a day before meals  metoprolol succinate  milliGRAM(s) Oral daily  pantoprazole    Tablet 40 milliGRAM(s) Oral before breakfast  saccharomyces boulardii 250 milliGRAM(s) Oral two times a day  warfarin 5 milliGRAM(s) Oral once    MEDICATIONS  (PRN):  acetaminophen   Tablet .. 650 milliGRAM(s) Oral every 6 hours PRN Temp greater or equal to 38C (100.4F), Mild Pain (1 - 3), Moderate Pain (4 - 6)  dextrose 40% Gel 15 Gram(s) Oral once PRN Blood Glucose LESS THAN 70 milliGRAM(s)/deciliter  glucagon  Injectable 1 milliGRAM(s) IntraMuscular once PRN Glucose LESS THAN 70 milligrams/deciliter  traMADol 25 milliGRAM(s) Oral four times a day PRN Severe Pain (7 - 10)      PHYSICAL EXAM:    Vital Signs Last 24 Hrs  T(C): 37 (17 Jun 2019 08:16), Max: 38 (16 Jun 2019 16:21)  T(F): 98.6 (17 Jun 2019 08:16), Max: 100.4 (16 Jun 2019 16:21)  HR: 61 (17 Jun 2019 08:16) (61 - 79)  BP: 149/75 (17 Jun 2019 08:16) (135/63 - 157/82)  BP(mean): --  RR: 18 (17 Jun 2019 08:16) (18 - 20)  SpO2: 95% (17 Jun 2019 08:16) (95% - 97%)    General: alert  oriented x _4___     Karnofsky: 40 %    HEENT: normal      Lungs: comfortable    CV: normal     GI: normal  constipation  last BM:     : normal      MSK: normal  bedbound/wheelchair bound    Skin: normal  pressure ulcers- Stage_____  no rash    LABS:                        9.1    4.3   )-----------( 132      ( 17 Jun 2019 08:17 )             28.2     06-17    134<L>  |  97<L>  |  63.0<H>  ----------------------------<  305<H>  4.2   |  26.0  |  2.65<H>    Ca    9.1      17 Jun 2019 08:17  Mg     1.8     06-17      PT/INR - ( 17 Jun 2019 08:17 )   PT: 30.3 sec;   INR: 2.56 ratio         PTT - ( 17 Jun 2019 08:17 )  PTT:37.4 sec    I&O's Summary    16 Jun 2019 07:01  -  17 Jun 2019 07:00  --------------------------------------------------------  IN: 1300 mL / OUT: 1100 mL / NET: 200 mL    17 Jun 2019 07:01  -  17 Jun 2019 13:28  --------------------------------------------------------  IN: 400 mL / OUT: 0 mL / NET: 400 mL        RADIOLOGY & ADDITIONAL STUDIES:    ADVANCE DIRECTIVES:   DNR  NO  Completed on:                     DEANDRE  YES Completed on: not signed by MD  Living Will   NO   Completed on:

## 2019-06-18 LAB
ANION GAP SERPL CALC-SCNC: 10 MMOL/L — SIGNIFICANT CHANGE UP (ref 5–17)
APTT BLD: 40 SEC — HIGH (ref 27.5–36.3)
BUN SERPL-MCNC: 60 MG/DL — HIGH (ref 8–20)
CALCIUM SERPL-MCNC: 9.4 MG/DL — SIGNIFICANT CHANGE UP (ref 8.6–10.2)
CHLORIDE SERPL-SCNC: 97 MMOL/L — LOW (ref 98–107)
CO2 SERPL-SCNC: 24 MMOL/L — SIGNIFICANT CHANGE UP (ref 22–29)
CREAT SERPL-MCNC: 2.48 MG/DL — HIGH (ref 0.5–1.3)
GLUCOSE BLDC GLUCOMTR-MCNC: 375 MG/DL — HIGH (ref 70–99)
GLUCOSE BLDC GLUCOMTR-MCNC: 398 MG/DL — HIGH (ref 70–99)
GLUCOSE BLDC GLUCOMTR-MCNC: 443 MG/DL — HIGH (ref 70–99)
GLUCOSE BLDC GLUCOMTR-MCNC: 510 MG/DL — CRITICAL HIGH (ref 70–99)
GLUCOSE BLDC GLUCOMTR-MCNC: 526 MG/DL — CRITICAL HIGH (ref 70–99)
GLUCOSE SERPL-MCNC: 405 MG/DL — HIGH (ref 70–115)
HCT VFR BLD CALC: 28.1 % — LOW (ref 42–52)
HGB BLD-MCNC: 9.4 G/DL — LOW (ref 14–18)
INR BLD: 2.89 RATIO — HIGH (ref 0.88–1.16)
MCHC RBC-ENTMCNC: 28.8 PG — SIGNIFICANT CHANGE UP (ref 27–31)
MCHC RBC-ENTMCNC: 33.5 G/DL — SIGNIFICANT CHANGE UP (ref 32–36)
MCV RBC AUTO: 86.2 FL — SIGNIFICANT CHANGE UP (ref 80–94)
PLATELET # BLD AUTO: 163 K/UL — SIGNIFICANT CHANGE UP (ref 150–400)
POTASSIUM SERPL-MCNC: 5.3 MMOL/L — SIGNIFICANT CHANGE UP (ref 3.5–5.3)
POTASSIUM SERPL-SCNC: 5.3 MMOL/L — SIGNIFICANT CHANGE UP (ref 3.5–5.3)
PROTHROM AB SERPL-ACNC: 34.4 SEC — HIGH (ref 10–12.9)
RBC # BLD: 3.26 M/UL — LOW (ref 4.6–6.2)
RBC # FLD: 15.6 % — SIGNIFICANT CHANGE UP (ref 11–15.6)
SODIUM SERPL-SCNC: 131 MMOL/L — LOW (ref 135–145)
WBC # BLD: 7.5 K/UL — SIGNIFICANT CHANGE UP (ref 4.8–10.8)
WBC # FLD AUTO: 7.5 K/UL — SIGNIFICANT CHANGE UP (ref 4.8–10.8)

## 2019-06-18 PROCEDURE — 99232 SBSQ HOSP IP/OBS MODERATE 35: CPT

## 2019-06-18 PROCEDURE — 99233 SBSQ HOSP IP/OBS HIGH 50: CPT

## 2019-06-18 RX ORDER — WARFARIN SODIUM 2.5 MG/1
3 TABLET ORAL ONCE
Refills: 0 | Status: COMPLETED | OUTPATIENT
Start: 2019-06-18 | End: 2019-06-18

## 2019-06-18 RX ORDER — INSULIN GLARGINE 100 [IU]/ML
25 INJECTION, SOLUTION SUBCUTANEOUS AT BEDTIME
Refills: 0 | Status: DISCONTINUED | OUTPATIENT
Start: 2019-06-18 | End: 2019-06-20

## 2019-06-18 RX ORDER — INSULIN LISPRO 100/ML
7 VIAL (ML) SUBCUTANEOUS
Refills: 0 | Status: DISCONTINUED | OUTPATIENT
Start: 2019-06-18 | End: 2019-06-20

## 2019-06-18 RX ORDER — INSULIN GLARGINE 100 [IU]/ML
20 INJECTION, SOLUTION SUBCUTANEOUS AT BEDTIME
Refills: 0 | Status: DISCONTINUED | OUTPATIENT
Start: 2019-06-18 | End: 2019-06-18

## 2019-06-18 RX ORDER — INSULIN LISPRO 100/ML
VIAL (ML) SUBCUTANEOUS
Refills: 0 | Status: DISCONTINUED | OUTPATIENT
Start: 2019-06-18 | End: 2019-06-20

## 2019-06-18 RX ADMIN — PANTOPRAZOLE SODIUM 40 MILLIGRAM(S): 20 TABLET, DELAYED RELEASE ORAL at 05:42

## 2019-06-18 RX ADMIN — ATORVASTATIN CALCIUM 40 MILLIGRAM(S): 80 TABLET, FILM COATED ORAL at 22:25

## 2019-06-18 RX ADMIN — Medication 5: at 07:48

## 2019-06-18 RX ADMIN — TRAMADOL HYDROCHLORIDE 25 MILLIGRAM(S): 50 TABLET ORAL at 20:27

## 2019-06-18 RX ADMIN — AMPICILLIN SODIUM AND SULBACTAM SODIUM 200 GRAM(S): 250; 125 INJECTION, POWDER, FOR SUSPENSION INTRAMUSCULAR; INTRAVENOUS at 05:42

## 2019-06-18 RX ADMIN — Medication 5: at 11:48

## 2019-06-18 RX ADMIN — Medication 250 MILLIGRAM(S): at 16:23

## 2019-06-18 RX ADMIN — Medication 7 UNIT(S): at 12:11

## 2019-06-18 RX ADMIN — AMPICILLIN SODIUM AND SULBACTAM SODIUM 200 GRAM(S): 250; 125 INJECTION, POWDER, FOR SUSPENSION INTRAMUSCULAR; INTRAVENOUS at 14:41

## 2019-06-18 RX ADMIN — INSULIN GLARGINE 25 UNIT(S): 100 INJECTION, SOLUTION SUBCUTANEOUS at 23:04

## 2019-06-18 RX ADMIN — GABAPENTIN 100 MILLIGRAM(S): 400 CAPSULE ORAL at 12:12

## 2019-06-18 RX ADMIN — TRAMADOL HYDROCHLORIDE 25 MILLIGRAM(S): 50 TABLET ORAL at 19:44

## 2019-06-18 RX ADMIN — CHLORHEXIDINE GLUCONATE 1 APPLICATION(S): 213 SOLUTION TOPICAL at 11:46

## 2019-06-18 RX ADMIN — Medication 7 UNIT(S): at 16:22

## 2019-06-18 RX ADMIN — Medication 100 MILLIGRAM(S): at 12:12

## 2019-06-18 RX ADMIN — WARFARIN SODIUM 3 MILLIGRAM(S): 2.5 TABLET ORAL at 22:25

## 2019-06-18 RX ADMIN — Medication 200 MILLIGRAM(S): at 05:42

## 2019-06-18 RX ADMIN — Medication 15: at 23:05

## 2019-06-18 RX ADMIN — Medication 40 MILLIGRAM(S): at 05:43

## 2019-06-18 RX ADMIN — AMPICILLIN SODIUM AND SULBACTAM SODIUM 200 GRAM(S): 250; 125 INJECTION, POWDER, FOR SUSPENSION INTRAMUSCULAR; INTRAVENOUS at 22:25

## 2019-06-18 RX ADMIN — GABAPENTIN 100 MILLIGRAM(S): 400 CAPSULE ORAL at 22:25

## 2019-06-18 RX ADMIN — Medication 6: at 16:22

## 2019-06-18 RX ADMIN — Medication 81 MILLIGRAM(S): at 12:12

## 2019-06-18 RX ADMIN — Medication 250 MILLIGRAM(S): at 05:41

## 2019-06-18 RX ADMIN — GABAPENTIN 100 MILLIGRAM(S): 400 CAPSULE ORAL at 05:43

## 2019-06-18 NOTE — PROVIDER CONTACT NOTE (OTHER) - ACTION/TREATMENT ORDERED:
^units of humalog given per insulin sliding scale.
Administer 6units of humalog
[place] 6units of humalog per insulin  sliding scale
MD aware, give standing insulin and sliding scale

## 2019-06-18 NOTE — PROGRESS NOTE ADULT - ASSESSMENT
78y/o male with AF, Gout, DM, arthritis admitted from [facility] with PNA, enterococcal bacteremia, course complicated by gouty attack 80y/o male with AF, Gout, DM, arthritis admitted from [facility] with PNA, enterococcal bacteremia, course complicated by gouty attack and hyperglycemia

## 2019-06-18 NOTE — PROGRESS NOTE ADULT - ASSESSMENT
62 year old male with PMH HTN, dyslipidemia, ESRD on HD, DM, CAD s/p CABG, CKD3 presented with dyspnea, fever and chills.   T101.2, WBC 12.6, Lact 2.2, SCr 1.98, INR 2.63 at admission      Enterococcus fecalis bacteremia. TTE without any vegetations CT abdomen with diarrhea in sigmoids as well as gastric thickening (which corresponds with patients history of GERD). Repeat culture  results negative.  - Vancomycin changed to Ampicillin as sensitive  - TTE in am (discussed with Dr Chaparro)    Multifocal pneumonia with associated sepsis present upon admission. Now afebrile, normoxic.  CT chest with bilateral infiltrates RUL, RML and BRETT. Sepsis and pneumonia resolved.  - Continue antibiotics, change to Enterococcal coverage after ABELINO.        CKD4, with MENDY (likely ATN), likely secondary to sepsis, diuretic, diarrhea  - Avoid  nephrotoxin; Lasix discontinued by Nephrology earlier   - Monitor SCr    Acute gouty Arthritis - Right knee, improved  - Prednisone 40 mg PO daily    AF, rate controlled, INR therapeutic  - Continue BB  - Coumadin tonight    DMT2, A1c 9.1. Hyperglycemia uncontrolled worsening increase with Prednisone for gout  - Continue BGM and Sliding scale Insulins (increased to 3U multiples)  - Lantus - increased to 25 from 18 Units  - Premeal Insulins increased to7 from 5 Units    Diarrhea, likely antibiotic associated - resolved  - monitor BM, Lytes  - Continue Probiotics      GERD  - Continue PPI      Dyslipidemia  - Continue Statin    Prophylactic measure  - INR therapeutic for VTEP  - Florastor for C. diff    Disposition - pending ABELINO, MENDY and Gout improvement. Anticipate back to [facility] in next 48 hours

## 2019-06-18 NOTE — PROGRESS NOTE ADULT - SUBJECTIVE AND OBJECTIVE BOX
[facility]  INFECTIOUS DISEASES AND INTERNAL MEDICINE at [place]  =======================================================  Francisco Gomez MD  Diplomates American Board of Internal Medicine and Infectious Diseases  =======================================================    TAI IFEANYI [id number]    Follow up: enterococcal bacteremia    Allergies:  No Known Allergies      Medications:  acetaminophen   Tablet .. 650 milliGRAM(s) Oral every 6 hours PRN  allopurinol 100 milliGRAM(s) Oral daily  ampicillin  IVPB      ampicillin  IVPB 2 Gram(s) IV Intermittent every 8 hours  aspirin enteric coated 81 milliGRAM(s) Oral daily  atorvastatin 40 milliGRAM(s) Oral at bedtime  dextrose 40% Gel 15 Gram(s) Oral once PRN  dextrose 5%. 1000 milliLiter(s) IV Continuous <Continuous>  dextrose 50% Injectable 12.5 Gram(s) IV Push once  dextrose 50% Injectable 25 Gram(s) IV Push once  dextrose 50% Injectable 25 Gram(s) IV Push once  furosemide    Tablet 40 milliGRAM(s) Oral daily  gabapentin 100 milliGRAM(s) Oral three times a day  glucagon  Injectable 1 milliGRAM(s) IntraMuscular once PRN  insulin glargine Injectable (LANTUS) 15 Unit(s) SubCutaneous at bedtime  insulin lispro (HumaLOG) corrective regimen sliding scale   SubCutaneous three times a day before meals  insulin lispro (HumaLOG) corrective regimen sliding scale   SubCutaneous at bedtime  metoprolol succinate  milliGRAM(s) Oral daily  pantoprazole    Tablet 40 milliGRAM(s) Oral before breakfast  saccharomyces boulardii 250 milliGRAM(s) Oral two times a day  traMADol 25 milliGRAM(s) Oral four times a day PRN  warfarin 5 milliGRAM(s) Oral once    SOCIAL       FAMILY   FAMILY HISTORY:    REVIEW OF SYSTEMS:  CONSTITUTIONAL:  No Fever or chills  HEENT:   No diplopia or blurred vision.  No earache, sore throat or runny nose.  CARDIOVASCULAR:  No pressure, squeezing, strangling, tightness, heaviness or aching about the chest, neck, axilla or epigastrium.  RESPIRATORY:  No cough, shortness of breath, PND or orthopnea.  GASTROINTESTINAL:  No nausea, vomiting or diarrhea.  GENITOURINARY:  No dysuria, frequency or urgency. No Blood in urine  MUSCULOSKELETAL:   AS PER HPI  SKIN:  No change in skin, hair or nails.  NEUROLOGIC:  No paresthesias, fasciculations, seizures or weakness.  PSYCHIATRIC:  No disorder of thought or mood.  ENDOCRINE:  No heat or cold intolerance, polyuria or polydipsia.  HEMATOLOGICAL:  No easy bruising or bleeding.            Physical Exam:    Vital Signs Last 24 Hrs  T(C): 36.9 (18 Jun 2019 08:05), Max: 36.9 (17 Jun 2019 17:05)  T(F): 98.5 (18 Jun 2019 08:05), Max: 98.5 (18 Jun 2019 08:05)  HR: 71 (18 Jun 2019 08:05) (67 - 86)  BP: 142/84 (18 Jun 2019 08:05) (142/84 - 159/77)  BP(mean): --  RR: 18 (18 Jun 2019 08:05) (18 - 18)  SpO2: 93% (18 Jun 2019 08:05) (93% - 96%)      GEN: NAD, pleasant  HEENT: normocephalic and atraumatic. EOMI. JOSE.    NECK: Supple. No carotid bruits.  No lymphadenopathy or thyromegaly.  LUNGS: Clear to auscultation.  HEART: Regular rate and rhythm without murmur.  ABDOMEN: Soft, nontender, and nondistended.  Positive bowel sounds.    : No CVA tenderness  EXTREMITIES: Without any cyanosis, clubbing, rash, lesions or edema.  MSK: no joint swelling  NEUROLOGIC: Cranial nerves II through XII are grossly intact.  PSYCHIATRIC: Appropriate affect .  SKIN: No ulceration or induration present.        Labs:                            9.4    7.5   )-----------( 163      ( 18 Jun 2019 07:44 )             28.1   06-18    131<L>  |  97<L>  |  60.0<H>  ----------------------------<  405<H>  5.3   |  24.0  |  2.48<H>    Ca    9.4      18 Jun 2019 07:44  Mg     1.8     06-17        RECENT CULTURES:  06-12 @ 09:23 .Blood Enterococcus faecalis  Blood Culture PCR    Growth in aerobic and anaerobic bottles: Enterococcus faecalis  Aerobic Bottle: 11.56 Hours to positivity  Anaerobic Bottle: 13:26 Hours to positivity  .  ***Blood Panel PCR results on this specimen are available  approximately 3 hours after the Gram stain result.***  Gram stain, PCR, and/or culture results may not always  correspond due to difference in methodologies.  ************************************************************  This PCR assay was performed using [employer].  The following targets are tested for: Enterococcus,  vancomycin resistant enterococci, Listeria monocytogenes,  coagulase negative staphylococci, S. aureus,  methicillin resistant S. aureus, Streptococcus agalactiae  (Group B), S. pneumoniae, S. pyogenes (Group A),  Acinetobacter baumannii, Enterobacter cloacae, E. coli,  Klebsiella oxytoca, K. pneumoniae, Proteus sp.,  Serratia marcescens, Haemophilus influenzae,  Neisseria meningitidis, Pseudomonas aeruginosa, Candida  albicans, C. glabrata, C krusei, C parapsilosis,  C. tropicalis and the KPC resistance gene.  "Due to technical problems, Proteus sp. will Not be reported as part of  the BCID panel until further notice"  .  TYPE: (C=Critical, N=Notification, A=Abnormal) C  TESTS:  _ GS BLD  DATE/TIME CALLED: _ 06/12/2019 22:53:24  CALLED TO: Maria Dolores RANGEL  READ BACK (2 Patient Identifiers)(Y/N): _ Y  READ BACK VALUES (Y/N): _ Y  CALLED BY: Maria Dolores BACK        06-12 @ 09:22 .Blood Enterococcus faecalis    Growth in aerobic and anaerobic bottles: Enterococcus faecalis  Aerobic Bottle: 12:26 Hours to positivity  Anaerobic Bottle: 12:36 Hours to positivity  .  TYPE: (C=Critical, N=Notification, A=Abnormal) C  TESTS:  _ Positive Blood GS  DATE/TIME CALLED: _ 06/13/2019 09:50  CALLED TO: _ 4TWR: Maude Matias RN  READ BACK (2 Patient Identifiers)(Y/N): _ Y  READ BACK VALUES (Y/N): _ Y  CALLED BY: Maria Dolores patricia        06-12 @ 09:20 .Urine     No growth

## 2019-06-18 NOTE — PROGRESS NOTE ADULT - PROBLEM SELECTOR PLAN 3
glycemic control has been a challenge and now with added prednisone for gout will continue to be hard to control   regimen being adjusted as needed

## 2019-06-18 NOTE — PROGRESS NOTE ADULT - ASSESSMENT
80 y/o male with h/o A Fib on coumadin CHF, Gout and arthritis who was admitted to [facility] about 9 days ago for incapacitation and unable to ambulate. patient developed SOB and chills this morning. no cough. no headache nausea or vomiting. no diarrhea or dysuria but frequent urination. In the ER, Temp: 101.2. O2 was 87, improved with O2 NC . CXR showed right upper and lower pneumonia.   PT WITH BLOOD CX ENTEROCOCCAL BACTEREMIA   on  AMPICILLIN   ECHO NEGATIVE BUT   RECOMMEND  ABELINO           POSSIBLE PNEUMONIA ON     UNASYN    IF ENDOCARDITIS  WOULD NEED  AMP/ROCEPHIN TX   WILL FOLLOW UP D/W HOSPITLSIT  CONTINUE CURRENT REGIMEN

## 2019-06-18 NOTE — PROGRESS NOTE ADULT - SUBJECTIVE AND OBJECTIVE BOX
NEPHROLOGY INTERVAL HPI/OVERNIGHT EVENTS:  pt doing well  no acute complaints today  awaiting ABELINO    MEDICATIONS  (STANDING):  allopurinol 100 milliGRAM(s) Oral daily  ampicillin/sulbactam  IVPB 3 Gram(s) IV Intermittent every 8 hours  aspirin enteric coated 81 milliGRAM(s) Oral daily  atorvastatin 40 milliGRAM(s) Oral at bedtime  chlorhexidine 2% Cloths 1 Application(s) Topical daily  dextrose 5%. 1000 milliLiter(s) (50 mL/Hr) IV Continuous <Continuous>  dextrose 50% Injectable 12.5 Gram(s) IV Push once  dextrose 50% Injectable 25 Gram(s) IV Push once  dextrose 50% Injectable 25 Gram(s) IV Push once  gabapentin 100 milliGRAM(s) Oral three times a day  insulin glargine Injectable (LANTUS) 20 Unit(s) SubCutaneous at bedtime  insulin lispro (HumaLOG) corrective regimen sliding scale   SubCutaneous three times a day before meals  insulin lispro (HumaLOG) corrective regimen sliding scale   SubCutaneous at bedtime  insulin lispro Injectable (HumaLOG) 7 Unit(s) SubCutaneous three times a day before meals  metoprolol succinate  milliGRAM(s) Oral daily  pantoprazole    Tablet 40 milliGRAM(s) Oral before breakfast  predniSONE   Tablet 40 milliGRAM(s) Oral daily  saccharomyces boulardii 250 milliGRAM(s) Oral two times a day  warfarin 3 milliGRAM(s) Oral once    MEDICATIONS  (PRN):  acetaminophen   Tablet .. 650 milliGRAM(s) Oral every 6 hours PRN Temp greater or equal to 38C (100.4F), Mild Pain (1 - 3), Moderate Pain (4 - 6)  dextrose 40% Gel 15 Gram(s) Oral once PRN Blood Glucose LESS THAN 70 milliGRAM(s)/deciliter  glucagon  Injectable 1 milliGRAM(s) IntraMuscular once PRN Glucose LESS THAN 70 milligrams/deciliter  traMADol 25 milliGRAM(s) Oral four times a day PRN Severe Pain (7 - 10)      Allergies    No Known Allergies          Vital Signs Last 24 Hrs  T(C): 36.9 (18 Jun 2019 08:05), Max: 36.9 (17 Jun 2019 17:05)  T(F): 98.5 (18 Jun 2019 08:05), Max: 98.5 (18 Jun 2019 08:05)  HR: 71 (18 Jun 2019 08:05) (67 - 86)  BP: 142/84 (18 Jun 2019 08:05) (142/84 - 159/77)  BP(mean): --  RR: 18 (18 Jun 2019 08:05) (18 - 18)  SpO2: 93% (18 Jun 2019 08:05) (93% - 96%)    PHYSICAL EXAM:  GENERAL: NAD; anxious  NECK: Supple, No JVD/Bruit  NERVOUS SYSTEM:  A/O x3, non focal  CHEST: Clear; diminished BS at bases  HEART:  RRR, No rub  ABDOMEN: Soft, NT/ND BS+  EXTREMITIES: Tr ankle edema; B/L LE varicosities  SKIN: No rashes    LABS:                        9.4    7.5   )-----------( 163      ( 18 Jun 2019 07:44 )             28.1     06-18    131<L>  |  97<L>  |  60.0<H>  ----------------------------<  405<H>  5.3   |  24.0  |  2.48<H>    Creatinine, Serum: 2.48 mg/dL (06.18.19 @ 07:44)        Ca    9.4      18 Jun 2019 07:44  Mg     1.8     06-17      PT/INR - ( 18 Jun 2019 07:44 )   PT: 34.4 sec;   INR: 2.89 ratio         PTT - ( 18 Jun 2019 07:44 )  PTT:40.0 sec        RADIOLOGY & ADDITIONAL TESTS:

## 2019-06-18 NOTE — PROGRESS NOTE ADULT - SUBJECTIVE AND OBJECTIVE BOX
HOSPITALIST PROGRESS NOTE    IFEANYI LUJAN  [id number]  79yMale    Patient is a 79y old  Male who presents with a chief complaint of SOB (18 Jun 2019 16:20)      SUBJECTIVE:   Chart reviewed since last visit. Hyperglycemia worsened by Prednisone, despite increase in Insulins daily.  Patient seen and examined at bedside earlier today for bacteremia, MFPNA, gout.  Denies any dyspnea, cough, chest pain or fevers.  Pain in knees decreased  ABELINO cancelled earlier today by anesthesiology due to hyperglycemia. Discussed with Anesthesiology Dr Drew, who postponed procedure quoting BG>300 with increased risk for morbidity and mortality.       OBJECTIVE:  Vital Signs Last 24 Hrs  T(C): 36.7 (18 Jun 2019 16:50), Max: 36.9 (18 Jun 2019 01:04)  T(F): 98 (18 Jun 2019 16:50), Max: 98.5 (18 Jun 2019 08:05)  HR: 68 (18 Jun 2019 16:50) (67 - 86)  BP: 138/74 (18 Jun 2019 16:50) (138/74 - 153/81)   RR: 20 (18 Jun 2019 16:50) (18 - 20)  SpO2: 93% (18 Jun 2019 16:50) (93% - 93%)    PHYSICAL EXAMINATION  General: Lying in bed, NAD  HEENT:  extraocular movements intact, moist oral mucosa  NECK:  Supple  CVS: regular rate and rhythm   RESP:  Clear to auscultation  GI:  Soft nondistended nontender BS+  : No suprapubic tenderness  MSK: Improved ROM  CNS:  Non focal exam  INTEG:  Warm dry skin  PSYCH:  Fair mood      MONITOR:  CAPILLARY BLOOD GLUCOSE      POCT Blood Glucose.: 443 mg/dL (18 Jun 2019 16:21)  POCT Blood Glucose.: 398 mg/dL (18 Jun 2019 11:48)  POCT Blood Glucose.: 375 mg/dL (18 Jun 2019 07:45)  POCT Blood Glucose.: 374 mg/dL (17 Jun 2019 22:50)  POCT Blood Glucose.: 387 mg/dL (17 Jun 2019 21:44)        I&O's Summary    17 Jun 2019 07:01  -  18 Jun 2019 07:00  --------------------------------------------------------  IN: 2720 mL / OUT: 820 mL / NET: 1900 mL    18 Jun 2019 07:01  -  18 Jun 2019 21:33  --------------------------------------------------------  IN: 960 mL / OUT: 1250 mL / NET: -290 mL                            9.4    7.5   )-----------( 163      ( 18 Jun 2019 07:44 )             28.1     PT/INR - ( 18 Jun 2019 07:44 )   PT: 34.4 sec;   INR: 2.89 ratio         PTT - ( 18 Jun 2019 07:44 )  PTT:40.0 sec  06-18    131<L>  |  97<L>  |  60.0<H>  ----------------------------<  405<H>  5.3   |  24.0  |  2.48<H>    Ca    9.4      18 Jun 2019 07:44  Mg     1.8     06-17              Culture:    TTE:    RADIOLOGY        MEDICATIONS  (STANDING):  allopurinol 100 milliGRAM(s) Oral daily  ampicillin/sulbactam  IVPB 3 Gram(s) IV Intermittent every 8 hours  aspirin enteric coated 81 milliGRAM(s) Oral daily  atorvastatin 40 milliGRAM(s) Oral at bedtime  chlorhexidine 2% Cloths 1 Application(s) Topical daily  dextrose 5%. 1000 milliLiter(s) (50 mL/Hr) IV Continuous <Continuous>  dextrose 50% Injectable 12.5 Gram(s) IV Push once  dextrose 50% Injectable 25 Gram(s) IV Push once  dextrose 50% Injectable 25 Gram(s) IV Push once  gabapentin 100 milliGRAM(s) Oral three times a day  insulin glargine Injectable (LANTUS) 25 Unit(s) SubCutaneous at bedtime  insulin lispro (HumaLOG) corrective regimen sliding scale   SubCutaneous three times a day before meals  insulin lispro (HumaLOG) corrective regimen sliding scale   SubCutaneous three times a day before meals  insulin lispro Injectable (HumaLOG) 7 Unit(s) SubCutaneous three times a day before meals  metoprolol succinate  milliGRAM(s) Oral daily  pantoprazole    Tablet 40 milliGRAM(s) Oral before breakfast  predniSONE   Tablet 40 milliGRAM(s) Oral daily  saccharomyces boulardii 250 milliGRAM(s) Oral two times a day  warfarin 3 milliGRAM(s) Oral once      MEDICATIONS  (PRN):  acetaminophen   Tablet .. 650 milliGRAM(s) Oral every 6 hours PRN Temp greater or equal to 38C (100.4F), Mild Pain (1 - 3), Moderate Pain (4 - 6)  dextrose 40% Gel 15 Gram(s) Oral once PRN Blood Glucose LESS THAN 70 milliGRAM(s)/deciliter  glucagon  Injectable 1 milliGRAM(s) IntraMuscular once PRN Glucose LESS THAN 70 milligrams/deciliter  traMADol 25 milliGRAM(s) Oral four times a day PRN Severe Pain (7 - 10)

## 2019-06-18 NOTE — PROGRESS NOTE ADULT - SUBJECTIVE AND OBJECTIVE BOX
IFEANYI LUJAN  [id number]      Chief Complaint:  PNA/Enterococcus Bacteremia    Interval History:  Patient c/o knee pain.  No other c/o.  Denies CP/SOB/palps.    Tele:  No events      acetaminophen   Tablet .. 650 milliGRAM(s) Oral every 6 hours PRN  allopurinol 100 milliGRAM(s) Oral daily  ampicillin/sulbactam  IVPB 3 Gram(s) IV Intermittent every 8 hours  aspirin enteric coated 81 milliGRAM(s) Oral daily  atorvastatin 40 milliGRAM(s) Oral at bedtime  chlorhexidine 2% Cloths 1 Application(s) Topical daily  dextrose 40% Gel 15 Gram(s) Oral once PRN  dextrose 5%. 1000 milliLiter(s) IV Continuous <Continuous>  dextrose 50% Injectable 12.5 Gram(s) IV Push once  dextrose 50% Injectable 25 Gram(s) IV Push once  dextrose 50% Injectable 25 Gram(s) IV Push once  gabapentin 100 milliGRAM(s) Oral three times a day  glucagon  Injectable 1 milliGRAM(s) IntraMuscular once PRN  insulin glargine Injectable (LANTUS) 20 Unit(s) SubCutaneous at bedtime  insulin lispro (HumaLOG) corrective regimen sliding scale   SubCutaneous three times a day before meals  insulin lispro (HumaLOG) corrective regimen sliding scale   SubCutaneous at bedtime  insulin lispro Injectable (HumaLOG) 7 Unit(s) SubCutaneous three times a day before meals  metoprolol succinate  milliGRAM(s) Oral daily  pantoprazole    Tablet 40 milliGRAM(s) Oral before breakfast  predniSONE   Tablet 40 milliGRAM(s) Oral daily  saccharomyces boulardii 250 milliGRAM(s) Oral two times a day  traMADol 25 milliGRAM(s) Oral four times a day PRN  warfarin 3 milliGRAM(s) Oral once          Physical Exam:  T(C): 36.9 (06-18-19 @ 08:05), Max: 36.9 (06-17-19 @ 17:05)  HR: 71 (06-18-19 @ 08:05) (67 - 86)  BP: 142/84 (06-18-19 @ 08:05) (142/84 - 159/77)  RR: 18 (06-18-19 @ 08:05) (18 - 18)  SpO2: 93% (06-18-19 @ 08:05) (93% - 96%)  Wt(kg): --  General: Comfortable in NAD  Neck: No JVD  CVS: nl s1s2, no s3  Pulm: CTA b/l  Abd: soft, non-tender  Ext: No c/c/e  Neuro A&O x3  Psych: Normal affect      Labs:   18 Jun 2019 07:44    131    |  97     |  60.0   ----------------------------<  405    5.3     |  24.0   |  2.48     Ca    9.4        18 Jun 2019 07:44  Mg     1.8       17 Jun 2019 08:17                            9.4    7.5   )-----------( 163      ( 18 Jun 2019 07:44 )             28.1     PT/INR - ( 18 Jun 2019 07:44 )   PT: 34.4 sec;   INR: 2.89 ratio         PTT - ( 18 Jun 2019 07:44 )  PTT:40.0 sec        Assessment:     79y Male with chronic AF, HTN, CKD, gout, HLD here with shortness of breath due to PNA. Has enterococcus bateremia, ? GI source. Agree with need for ABELINO to ensure no evidence endocarditis. TTE fairly unremarkable and no gross evidence of vegetation.   -ABELINO canceled today 2/2 persistent hyperglycemia.    Plan:  1. NPO after midnight for ABELINO tomorrow.  2. Antibiotics per ID  3. Rate control and A/C for AF.  4. Glucose control per med.    D/w Dr. Arango.

## 2019-06-18 NOTE — PROGRESS NOTE ADULT - PROBLEM SELECTOR PLAN 6
call placed to Erin, pt's granddaughter- await call back, spoke to pt and daughter at bedside, both acknowledged that he has been hospitalized frequently but daughter stated that he bounces back quickly. Tried to assess pt's feelings on this but he was guarded. Will try to confirm MOLST with HCP Erin.

## 2019-06-18 NOTE — PROGRESS NOTE ADULT - ASSESSMENT
CKD(IV):  MENDY: vol depletion from diuretics==> improving with cessation of diuretic  Hyponatremia with definite component of hyperglycemia  PNA with enteroccal bacteremia  - cont to hold diuretics for now  - optimize glucose control  - avoid potential nephrotoxins  - watch labs  - for ABELINO today to r/o SBE

## 2019-06-18 NOTE — PROGRESS NOTE ADULT - SUBJECTIVE AND OBJECTIVE BOX
OVERNIGHT EVENTS:    BRIEF HOSPITAL COURSE:    Present Symptoms:     Dyspnea: 0 1 2 3   Nausea/Vomiting: Yes No  Anxiety:  Yes No  Depression: Yes No  Fatigue: Yes No  Loss of appetite: Yes No    Pain:             Character-            Duration-            Effect-            Factors-            Frequency-            Location-            Severity-    Review of Systems: Reviewed                     Negative:                     Positive:  Unable to obtain due to poor mentation   All others negative    MEDICATIONS  (STANDING):  allopurinol 100 milliGRAM(s) Oral daily  ampicillin/sulbactam  IVPB 3 Gram(s) IV Intermittent every 8 hours  aspirin enteric coated 81 milliGRAM(s) Oral daily  atorvastatin 40 milliGRAM(s) Oral at bedtime  chlorhexidine 2% Cloths 1 Application(s) Topical daily  dextrose 5%. 1000 milliLiter(s) (50 mL/Hr) IV Continuous <Continuous>  dextrose 50% Injectable 12.5 Gram(s) IV Push once  dextrose 50% Injectable 25 Gram(s) IV Push once  dextrose 50% Injectable 25 Gram(s) IV Push once  gabapentin 100 milliGRAM(s) Oral three times a day  insulin glargine Injectable (LANTUS) 20 Unit(s) SubCutaneous at bedtime  insulin lispro (HumaLOG) corrective regimen sliding scale   SubCutaneous three times a day before meals  insulin lispro (HumaLOG) corrective regimen sliding scale   SubCutaneous at bedtime  insulin lispro Injectable (HumaLOG) 7 Unit(s) SubCutaneous three times a day before meals  metoprolol succinate  milliGRAM(s) Oral daily  pantoprazole    Tablet 40 milliGRAM(s) Oral before breakfast  predniSONE   Tablet 40 milliGRAM(s) Oral daily  saccharomyces boulardii 250 milliGRAM(s) Oral two times a day  warfarin 3 milliGRAM(s) Oral once    MEDICATIONS  (PRN):  acetaminophen   Tablet .. 650 milliGRAM(s) Oral every 6 hours PRN Temp greater or equal to 38C (100.4F), Mild Pain (1 - 3), Moderate Pain (4 - 6)  dextrose 40% Gel 15 Gram(s) Oral once PRN Blood Glucose LESS THAN 70 milliGRAM(s)/deciliter  glucagon  Injectable 1 milliGRAM(s) IntraMuscular once PRN Glucose LESS THAN 70 milligrams/deciliter  traMADol 25 milliGRAM(s) Oral four times a day PRN Severe Pain (7 - 10)      PHYSICAL EXAM:    Vital Signs Last 24 Hrs  T(C): 36.9 (18 Jun 2019 08:05), Max: 36.9 (17 Jun 2019 17:05)  T(F): 98.5 (18 Jun 2019 08:05), Max: 98.5 (18 Jun 2019 08:05)  HR: 71 (18 Jun 2019 08:05) (67 - 86)  BP: 142/84 (18 Jun 2019 08:05) (142/84 - 159/77)  BP(mean): --  RR: 18 (18 Jun 2019 08:05) (18 - 18)  SpO2: 93% (18 Jun 2019 08:05) (93% - 96%)    General: alert  oriented x ____ lethargic agitated                  cachexia  nonverbal  coma    Karnofsky:  %    HEENT: normal  dry mouth  ET tube/trach    Lungs: comfortable tachypnea/labored breathing  excessive secretions    CV: normal  tachycardia    GI: normal  distended  tender  no BS               PEG/NG/OG tube  constipation  last BM:     : normal  incontinent  oliguria/anuria  aguilar    MSK: normal  weakness  edema             ambulatory  bedbound/wheelchair bound    Skin: normal  pressure ulcers- Stage_____  no rash    LABS:                          9.4    7.5   )-----------( 163      ( 18 Jun 2019 07:44 )             28.1     06-18    131<L>  |  97<L>  |  60.0<H>  ----------------------------<  405<H>  5.3   |  24.0  |  2.48<H>    Ca    9.4      18 Jun 2019 07:44  Mg     1.8     06-17      PT/INR - ( 18 Jun 2019 07:44 )   PT: 34.4 sec;   INR: 2.89 ratio         PTT - ( 18 Jun 2019 07:44 )  PTT:40.0 sec    I&O's Summary    17 Jun 2019 07:01  -  18 Jun 2019 07:00  --------------------------------------------------------  IN: 2720 mL / OUT: 820 mL / NET: 1900 mL    18 Jun 2019 07:01  -  18 Jun 2019 13:52  --------------------------------------------------------  IN: 0 mL / OUT: 500 mL / NET: -500 mL        RADIOLOGY & ADDITIONAL STUDIES:    ADVANCE DIRECTIVES:   DNR YES NO  Completed on:                     MOLST  YES NO   Completed on:  Living Will  YES NO   Completed on: OVERNIGHT EVENTS: none    BRIEF HOSPITAL COURSE:78 y/o male with h/o A Fib on coumadin CHF, Gout and arthritis who was admitted to [facility] after hospitalization at [place] as he was unable to ambulate. He was sent to [facility] from [facility] for fever and found to have enterococcal bacteremia, multifocal PNA. Hospital course complicated by hyperglycemia, MENDY on CKD and now gouty flare.     Present Symptoms:     Dyspnea: 0   Nausea/Vomiting:  No  Anxiety:  No  Depression:  No  Fatigue: No  Loss of appetite:  No    Pain:             Character- knife like stabbing            Duration- constant            Effect- immobilizing            Factors-worse w movement             Frequency- constant             Location-both knees R>L            Severity-  R 7, L 2    Review of Systems: Reviewed                     Negative:                     Positive: pain in knees  All others negative    MEDICATIONS  (STANDING):  allopurinol 100 milliGRAM(s) Oral daily  ampicillin/sulbactam  IVPB 3 Gram(s) IV Intermittent every 8 hours  aspirin enteric coated 81 milliGRAM(s) Oral daily  atorvastatin 40 milliGRAM(s) Oral at bedtime  chlorhexidine 2% Cloths 1 Application(s) Topical daily  dextrose 5%. 1000 milliLiter(s) (50 mL/Hr) IV Continuous <Continuous>  dextrose 50% Injectable 12.5 Gram(s) IV Push once  dextrose 50% Injectable 25 Gram(s) IV Push once  dextrose 50% Injectable 25 Gram(s) IV Push once  gabapentin 100 milliGRAM(s) Oral three times a day  insulin glargine Injectable (LANTUS) 20 Unit(s) SubCutaneous at bedtime  insulin lispro (HumaLOG) corrective regimen sliding scale   SubCutaneous three times a day before meals  insulin lispro (HumaLOG) corrective regimen sliding scale   SubCutaneous at bedtime  insulin lispro Injectable (HumaLOG) 7 Unit(s) SubCutaneous three times a day before meals  metoprolol succinate  milliGRAM(s) Oral daily  pantoprazole    Tablet 40 milliGRAM(s) Oral before breakfast  predniSONE   Tablet 40 milliGRAM(s) Oral daily  saccharomyces boulardii 250 milliGRAM(s) Oral two times a day  warfarin 3 milliGRAM(s) Oral once    MEDICATIONS  (PRN):  acetaminophen   Tablet .. 650 milliGRAM(s) Oral every 6 hours PRN Temp greater or equal to 38C (100.4F), Mild Pain (1 - 3), Moderate Pain (4 - 6)  dextrose 40% Gel 15 Gram(s) Oral once PRN Blood Glucose LESS THAN 70 milliGRAM(s)/deciliter  glucagon  Injectable 1 milliGRAM(s) IntraMuscular once PRN Glucose LESS THAN 70 milligrams/deciliter  traMADol 25 milliGRAM(s) Oral four times a day PRN Severe Pain (7 - 10)      PHYSICAL EXAM:    Vital Signs Last 24 Hrs  T(C): 36.9 (18 Jun 2019 08:05), Max: 36.9 (17 Jun 2019 17:05)  T(F): 98.5 (18 Jun 2019 08:05), Max: 98.5 (18 Jun 2019 08:05)  HR: 71 (18 Jun 2019 08:05) (67 - 86)  BP: 142/84 (18 Jun 2019 08:05) (142/84 - 159/77)  BP(mean): --  RR: 18 (18 Jun 2019 08:05) (18 - 18)  SpO2: 93% (18 Jun 2019 08:05) (93% - 96%)    General: alert  oriented x _4___                     Karnofsky:  40%    HEENT: normal      Lungs: comfortable     CV: normal     GI: normal constipation  last BM: 6/16    : normal      MSK:  motion limited by pain bedbound/wheelchair bound    Skin: normal   no rash    LABS:                          9.4    7.5   )-----------( 163      ( 18 Jun 2019 07:44 )             28.1     06-18    131<L>  |  97<L>  |  60.0<H>  ----------------------------<  405<H>  5.3   |  24.0  |  2.48<H>    Ca    9.4      18 Jun 2019 07:44  Mg     1.8     06-17      PT/INR - ( 18 Jun 2019 07:44 )   PT: 34.4 sec;   INR: 2.89 ratio         PTT - ( 18 Jun 2019 07:44 )  PTT:40.0 sec    I&O's Summary    17 Jun 2019 07:01  -  18 Jun 2019 07:00  --------------------------------------------------------  IN: 2720 mL / OUT: 820 mL / NET: 1900 mL    18 Jun 2019 07:01  -  18 Jun 2019 13:52  --------------------------------------------------------  IN: 0 mL / OUT: 500 mL / NET: -500 mL        RADIOLOGY & ADDITIONAL STUDIES:    ADVANCE DIRECTIVES:   DNR  NO  Completed on:                     MOLST  YES   Completed on: - from SNF-full code- not signed by MD  Living Will   NO   Completed on:

## 2019-06-19 LAB
ANION GAP SERPL CALC-SCNC: 12 MMOL/L — SIGNIFICANT CHANGE UP (ref 5–17)
APPEARANCE UR: CLEAR — SIGNIFICANT CHANGE UP
APTT BLD: 38.4 SEC — HIGH (ref 27.5–36.3)
BACTERIA # UR AUTO: ABNORMAL
BILIRUB UR-MCNC: NEGATIVE — SIGNIFICANT CHANGE UP
BUN SERPL-MCNC: 69 MG/DL — HIGH (ref 8–20)
CALCIUM SERPL-MCNC: 9.2 MG/DL — SIGNIFICANT CHANGE UP (ref 8.6–10.2)
CHLORIDE SERPL-SCNC: 98 MMOL/L — SIGNIFICANT CHANGE UP (ref 98–107)
CO2 SERPL-SCNC: 24 MMOL/L — SIGNIFICANT CHANGE UP (ref 22–29)
COLOR SPEC: YELLOW — SIGNIFICANT CHANGE UP
CREAT SERPL-MCNC: 2.41 MG/DL — HIGH (ref 0.5–1.3)
CULTURE RESULTS: SIGNIFICANT CHANGE UP
CULTURE RESULTS: SIGNIFICANT CHANGE UP
DIFF PNL FLD: NEGATIVE — SIGNIFICANT CHANGE UP
EPI CELLS # UR: SIGNIFICANT CHANGE UP
GLUCOSE BLDC GLUCOMTR-MCNC: 145 MG/DL — HIGH (ref 70–99)
GLUCOSE BLDC GLUCOMTR-MCNC: 195 MG/DL — HIGH (ref 70–99)
GLUCOSE BLDC GLUCOMTR-MCNC: 288 MG/DL — HIGH (ref 70–99)
GLUCOSE BLDC GLUCOMTR-MCNC: 291 MG/DL — HIGH (ref 70–99)
GLUCOSE BLDC GLUCOMTR-MCNC: 365 MG/DL — HIGH (ref 70–99)
GLUCOSE BLDC GLUCOMTR-MCNC: 368 MG/DL — HIGH (ref 70–99)
GLUCOSE BLDC GLUCOMTR-MCNC: 411 MG/DL — HIGH (ref 70–99)
GLUCOSE BLDC GLUCOMTR-MCNC: 454 MG/DL — CRITICAL HIGH (ref 70–99)
GLUCOSE BLDC GLUCOMTR-MCNC: 467 MG/DL — CRITICAL HIGH (ref 70–99)
GLUCOSE BLDC GLUCOMTR-MCNC: 506 MG/DL — CRITICAL HIGH (ref 70–99)
GLUCOSE SERPL-MCNC: 389 MG/DL — HIGH (ref 70–115)
GLUCOSE UR QL: 1000 MG/DL
HCT VFR BLD CALC: 27.6 % — LOW (ref 42–52)
HGB BLD-MCNC: 9.1 G/DL — LOW (ref 14–18)
INR BLD: 4.02 RATIO — HIGH (ref 0.88–1.16)
KETONES UR-MCNC: NEGATIVE — SIGNIFICANT CHANGE UP
LEUKOCYTE ESTERASE UR-ACNC: NEGATIVE — SIGNIFICANT CHANGE UP
MCHC RBC-ENTMCNC: 28.5 PG — SIGNIFICANT CHANGE UP (ref 27–31)
MCHC RBC-ENTMCNC: 33 G/DL — SIGNIFICANT CHANGE UP (ref 32–36)
MCV RBC AUTO: 86.5 FL — SIGNIFICANT CHANGE UP (ref 80–94)
NITRITE UR-MCNC: NEGATIVE — SIGNIFICANT CHANGE UP
PH UR: 6 — SIGNIFICANT CHANGE UP (ref 5–8)
PLATELET # BLD AUTO: 195 K/UL — SIGNIFICANT CHANGE UP (ref 150–400)
POTASSIUM SERPL-MCNC: 4.5 MMOL/L — SIGNIFICANT CHANGE UP (ref 3.5–5.3)
POTASSIUM SERPL-SCNC: 4.5 MMOL/L — SIGNIFICANT CHANGE UP (ref 3.5–5.3)
PROT UR-MCNC: 15 MG/DL
PROTHROM AB SERPL-ACNC: 48.3 SEC — HIGH (ref 10–12.9)
RBC # BLD: 3.19 M/UL — LOW (ref 4.6–6.2)
RBC # FLD: 16.3 % — HIGH (ref 11–15.6)
RBC CASTS # UR COMP ASSIST: NEGATIVE /HPF — SIGNIFICANT CHANGE UP (ref 0–4)
SODIUM SERPL-SCNC: 134 MMOL/L — LOW (ref 135–145)
SP GR SPEC: 1.01 — SIGNIFICANT CHANGE UP (ref 1.01–1.02)
SPECIMEN SOURCE: SIGNIFICANT CHANGE UP
SPECIMEN SOURCE: SIGNIFICANT CHANGE UP
UROBILINOGEN FLD QL: NEGATIVE MG/DL — SIGNIFICANT CHANGE UP
WBC # BLD: 10.2 K/UL — SIGNIFICANT CHANGE UP (ref 4.8–10.8)
WBC # FLD AUTO: 10.2 K/UL — SIGNIFICANT CHANGE UP (ref 4.8–10.8)
WBC UR QL: SIGNIFICANT CHANGE UP

## 2019-06-19 PROCEDURE — 99232 SBSQ HOSP IP/OBS MODERATE 35: CPT

## 2019-06-19 PROCEDURE — 36569 INSJ PICC 5 YR+ W/O IMAGING: CPT

## 2019-06-19 PROCEDURE — 93312 ECHO TRANSESOPHAGEAL: CPT | Mod: 26

## 2019-06-19 PROCEDURE — 93320 DOPPLER ECHO COMPLETE: CPT | Mod: 26

## 2019-06-19 PROCEDURE — 76376 3D RENDER W/INTRP POSTPROCES: CPT | Mod: 26

## 2019-06-19 PROCEDURE — 71045 X-RAY EXAM CHEST 1 VIEW: CPT | Mod: 26

## 2019-06-19 PROCEDURE — 76942 ECHO GUIDE FOR BIOPSY: CPT | Mod: 26,59

## 2019-06-19 PROCEDURE — 76937 US GUIDE VASCULAR ACCESS: CPT | Mod: 26,59

## 2019-06-19 PROCEDURE — 93325 DOPPLER ECHO COLOR FLOW MAPG: CPT | Mod: 26

## 2019-06-19 PROCEDURE — 99222 1ST HOSP IP/OBS MODERATE 55: CPT

## 2019-06-19 RX ORDER — SODIUM CHLORIDE 9 MG/ML
1000 INJECTION INTRAMUSCULAR; INTRAVENOUS; SUBCUTANEOUS
Refills: 0 | Status: DISCONTINUED | OUTPATIENT
Start: 2019-06-19 | End: 2019-06-19

## 2019-06-19 RX ORDER — SODIUM CHLORIDE 9 MG/ML
500 INJECTION INTRAMUSCULAR; INTRAVENOUS; SUBCUTANEOUS ONCE
Refills: 0 | Status: COMPLETED | OUTPATIENT
Start: 2019-06-19 | End: 2019-06-19

## 2019-06-19 RX ORDER — TRAMADOL HYDROCHLORIDE 50 MG/1
25 TABLET ORAL
Refills: 0 | Status: DISCONTINUED | OUTPATIENT
Start: 2019-06-19 | End: 2019-06-20

## 2019-06-19 RX ORDER — INSULIN LISPRO 100/ML
5 VIAL (ML) SUBCUTANEOUS ONCE
Refills: 0 | Status: COMPLETED | OUTPATIENT
Start: 2019-06-19 | End: 2019-06-19

## 2019-06-19 RX ORDER — INSULIN HUMAN 100 [IU]/ML
10 INJECTION, SOLUTION SUBCUTANEOUS ONCE
Refills: 0 | Status: COMPLETED | OUTPATIENT
Start: 2019-06-19 | End: 2019-06-19

## 2019-06-19 RX ORDER — INSULIN GLARGINE 100 [IU]/ML
10 INJECTION, SOLUTION SUBCUTANEOUS EVERY MORNING
Refills: 0 | Status: DISCONTINUED | OUTPATIENT
Start: 2019-06-19 | End: 2019-06-20

## 2019-06-19 RX ADMIN — Medication 5 UNIT(S): at 03:19

## 2019-06-19 RX ADMIN — Medication 200 MILLIGRAM(S): at 05:52

## 2019-06-19 RX ADMIN — AMPICILLIN SODIUM AND SULBACTAM SODIUM 200 GRAM(S): 250; 125 INJECTION, POWDER, FOR SUSPENSION INTRAMUSCULAR; INTRAVENOUS at 05:52

## 2019-06-19 RX ADMIN — Medication 7 UNIT(S): at 07:32

## 2019-06-19 RX ADMIN — ATORVASTATIN CALCIUM 40 MILLIGRAM(S): 80 TABLET, FILM COATED ORAL at 22:32

## 2019-06-19 RX ADMIN — Medication 9: at 07:33

## 2019-06-19 RX ADMIN — PANTOPRAZOLE SODIUM 40 MILLIGRAM(S): 20 TABLET, DELAYED RELEASE ORAL at 05:51

## 2019-06-19 RX ADMIN — INSULIN GLARGINE 10 UNIT(S): 100 INJECTION, SOLUTION SUBCUTANEOUS at 08:44

## 2019-06-19 RX ADMIN — Medication 7 UNIT(S): at 14:05

## 2019-06-19 RX ADMIN — AMPICILLIN SODIUM AND SULBACTAM SODIUM 200 GRAM(S): 250; 125 INJECTION, POWDER, FOR SUSPENSION INTRAMUSCULAR; INTRAVENOUS at 14:05

## 2019-06-19 RX ADMIN — SODIUM CHLORIDE 100 MILLILITER(S): 9 INJECTION INTRAMUSCULAR; INTRAVENOUS; SUBCUTANEOUS at 07:22

## 2019-06-19 RX ADMIN — GABAPENTIN 100 MILLIGRAM(S): 400 CAPSULE ORAL at 05:52

## 2019-06-19 RX ADMIN — TRAMADOL HYDROCHLORIDE 25 MILLIGRAM(S): 50 TABLET ORAL at 17:42

## 2019-06-19 RX ADMIN — Medication 40 MILLIGRAM(S): at 05:52

## 2019-06-19 RX ADMIN — INSULIN HUMAN 10 UNIT(S): 100 INJECTION, SOLUTION SUBCUTANEOUS at 06:06

## 2019-06-19 RX ADMIN — Medication 250 MILLIGRAM(S): at 05:51

## 2019-06-19 RX ADMIN — Medication 5 UNIT(S): at 00:49

## 2019-06-19 RX ADMIN — Medication 81 MILLIGRAM(S): at 14:04

## 2019-06-19 RX ADMIN — INSULIN GLARGINE 25 UNIT(S): 100 INJECTION, SOLUTION SUBCUTANEOUS at 22:32

## 2019-06-19 RX ADMIN — AMPICILLIN SODIUM AND SULBACTAM SODIUM 200 GRAM(S): 250; 125 INJECTION, POWDER, FOR SUSPENSION INTRAMUSCULAR; INTRAVENOUS at 22:32

## 2019-06-19 RX ADMIN — Medication 7 UNIT(S): at 17:43

## 2019-06-19 RX ADMIN — SODIUM CHLORIDE 500 MILLILITER(S): 9 INJECTION INTRAMUSCULAR; INTRAVENOUS; SUBCUTANEOUS at 06:11

## 2019-06-19 RX ADMIN — GABAPENTIN 100 MILLIGRAM(S): 400 CAPSULE ORAL at 22:32

## 2019-06-19 RX ADMIN — Medication 100 MILLIGRAM(S): at 14:04

## 2019-06-19 RX ADMIN — Medication 9: at 17:43

## 2019-06-19 RX ADMIN — CHLORHEXIDINE GLUCONATE 1 APPLICATION(S): 213 SOLUTION TOPICAL at 13:54

## 2019-06-19 RX ADMIN — GABAPENTIN 100 MILLIGRAM(S): 400 CAPSULE ORAL at 14:04

## 2019-06-19 RX ADMIN — TRAMADOL HYDROCHLORIDE 25 MILLIGRAM(S): 50 TABLET ORAL at 18:30

## 2019-06-19 NOTE — DIETITIAN INITIAL EVALUATION ADULT. - OTHER INFO
Pt admitted for pneumonia. Tolerating diet 75% PO intake, currently NPO for test. Left T2DM nutrition literature at bedside, RD to return for verbal education.

## 2019-06-19 NOTE — CONSULT NOTE ADULT - ASSESSMENT
CKD - cause unclear but likely d/t Dm and Htn ; Need old records and baseline creeatinine  w/u ccordinglt  Check Sono  24 H urines - Alb 2.9  R PNA - on Zosyn   Labs am
T2DM - on insuin and now steroids for gout   COntinue current split  dose LAntus      pre meal Coverage    Adjust insulin once steroids are  finsihded
80y/o male with AF, Gout, DM, arthritis admitted from [facility] with PNA, enterococcal bacteremia, course complicated by gouty attack

## 2019-06-19 NOTE — PROGRESS NOTE ADULT - PROBLEM SELECTOR PLAN 1
ABELINO today  previously canceled 2/2 to persistent hypoglycemia  POCT  - procedure to proceed as scheduled
continues on Unasyn  no overt respiratory symptoms or complaints

## 2019-06-19 NOTE — CHART NOTE - NSCHARTNOTEFT_GEN_A_CORE
Pt had ABELINO today which was negative for vegetations, PICC placed. Pt still complaining of pain in right knee, counseled him on the use of pain meds (he has orders for PRN tramadol). Call placed to pt's HCP Erin Garcia- long conversation ensued regarding pt's medical course, his prior hospitalizations (at outside hospitals), and goal to regain his ambulation at [facility] and return home. She was not aware that a MOLST was done at [facility] with her grandfather (in chart not signed by MD) and wants to have a discussion with him prior to completing a MOLST here, she will be in after work today.  I raised the concern that patient had a decline in function and recently had recurrent admissions as this could portend a poor prognosis, she acknowledged his recent decline and was hopeful he would regain his strength and return home to their house they share (mother-daughter style home). Palliative will continue to follow for GOC and pain control.

## 2019-06-19 NOTE — PROGRESS NOTE ADULT - SUBJECTIVE AND OBJECTIVE BOX
HOSPITALIST PROGRESS NOTE    IFEANYI LUJAN  [id number]  79yMale    Patient is a 79y old  Male who presents with a chief complaint of SOB (2019 10:12)      SUBJECTIVE:   Chart reviewed since last visit.  Patient seen and examined at bedside for bacteremia, gout, Multifocal pneumonia  Denies any dyspnea, chest pain, cough, chills.  Knee pain better but not resolved      OBJECTIVE:  Vital Signs Last 24 Hrs  T(C): 36.8 (2019 16:02), Max: 36.8 (2019 23:41)  T(F): 98.3 (2019 16:02), Max: 98.3 (2019 16:02)  HR: 69 (2019 16:02) (56 - 69)  BP: 148/70 (2019 16:02) (148/70 - 176/79)   RR: 189 (2019 16:02) (16 - 189)  SpO2: 97% (2019 16:02) (97% - 98%)    PHYSICAL EXAMINATION  General: Lying in bed, NAD  HEENT:  extraocular movements intact, moist oral mucosa  NECK:  Supple  CVS: regular rate and rhythm   RESP:  Clear to auscultation  GI:  Soft nondistended nontender BS+  : No suprapubic tenderness  MSK: Improved ROM. PICC+RUE  CNS:  Non focal exam  INTEG:  Warm dry skin  PSYCH:  Fair mood      MONITOR:  CAPILLARY BLOOD GLUCOSE      POCT Blood Glucose.: 145 mg/dL (2019 13:54)  POCT Blood Glucose.: 195 mg/dL (2019 09:32)  POCT Blood Glucose.: 288 mg/dL (2019 07:31)  POCT Blood Glucose.: 365 mg/dL (2019 06:03)  POCT Blood Glucose.: 411 mg/dL (2019 04:24)  POCT Blood Glucose.: 454 mg/dL (2019 03:02)  POCT Blood Glucose.: 467 mg/dL (2019 01:52)  POCT Blood Glucose.: 506 mg/dL (2019 00:25)  POCT Blood Glucose.: 526 mg/dL (2019 23:01)  POCT Blood Glucose.: 510 mg/dL (2019 22:58)        I&O's Summary    2019 07:01  -  2019 07:00  --------------------------------------------------------  IN: 1460 mL / OUT: 1900 mL / NET: -440 mL    2019 07:01  -  2019 17:17  --------------------------------------------------------  IN: 0 mL / OUT: 400 mL / NET: -400 mL                            9.1    10.2  )-----------( 195      ( 2019 05:24 )             27.6     PT/INR - ( 2019 05:24 )   PT: 48.3 sec;   INR: 4.02 ratio         PTT - ( 2019 05:24 )  PTT:38.4 sec      134<L>  |  98  |  69.0<H>  ----------------------------<  389<H>  4.5   |  24.0  |  2.41<H>    Ca    9.2      2019 05:24          Urinalysis Basic - ( 2019 07:32 )    Color: Yellow / Appearance: Clear / S.015 / pH: x  Gluc: x / Ketone: Negative  / Bili: Negative / Urobili: Negative mg/dL   Blood: x / Protein: 15 mg/dL / Nitrite: Negative   Leuk Esterase: Negative / RBC: Negative /HPF / WBC 0-2   Sq Epi: x / Non Sq Epi: Few / Bacteria: Few        Culture:    TTE:    RADIOLOGY        MEDICATIONS  (STANDING):  allopurinol 100 milliGRAM(s) Oral daily  ampicillin/sulbactam  IVPB 3 Gram(s) IV Intermittent every 8 hours  aspirin enteric coated 81 milliGRAM(s) Oral daily  atorvastatin 40 milliGRAM(s) Oral at bedtime  chlorhexidine 2% Cloths 1 Application(s) Topical daily  dextrose 5%. 1000 milliLiter(s) (50 mL/Hr) IV Continuous <Continuous>  dextrose 50% Injectable 12.5 Gram(s) IV Push once  dextrose 50% Injectable 25 Gram(s) IV Push once  dextrose 50% Injectable 25 Gram(s) IV Push once  gabapentin 100 milliGRAM(s) Oral three times a day  insulin glargine Injectable (LANTUS) 25 Unit(s) SubCutaneous at bedtime  insulin glargine Injectable (LANTUS) 10 Unit(s) SubCutaneous every morning  insulin lispro (HumaLOG) corrective regimen sliding scale   SubCutaneous three times a day before meals  insulin lispro Injectable (HumaLOG) 7 Unit(s) SubCutaneous three times a day before meals  metoprolol succinate  milliGRAM(s) Oral daily  pantoprazole    Tablet 40 milliGRAM(s) Oral before breakfast  predniSONE   Tablet 40 milliGRAM(s) Oral daily  saccharomyces boulardii 250 milliGRAM(s) Oral two times a day      MEDICATIONS  (PRN):  acetaminophen   Tablet .. 650 milliGRAM(s) Oral every 6 hours PRN Temp greater or equal to 38C (100.4F), Mild Pain (1 - 3), Moderate Pain (4 - 6)  dextrose 40% Gel 15 Gram(s) Oral once PRN Blood Glucose LESS THAN 70 milliGRAM(s)/deciliter  glucagon  Injectable 1 milliGRAM(s) IntraMuscular once PRN Glucose LESS THAN 70 milligrams/deciliter  traMADol 25 milliGRAM(s) Oral four times a day PRN Moderate Pain (4 - 6)

## 2019-06-19 NOTE — PROGRESS NOTE ADULT - SUBJECTIVE AND OBJECTIVE BOX
IFEANYI LUJAN  [id number]    After risks, benefits and alternatives of the procedure were explained, consent was signded and placed in the medical record.  Procedural timeout was taken.  Sedation was administered by anesthesia.  ABELINO probe inserted without complication and ABELINO performed.   Patient tolerated the procedure well without complication.  See full report for findings.     Prelim Findings  1. NOrmal BiV function  2. MOderate to severe LAE and MERA dilation without thrombus but dense spontaneous echo contrast  3. Mild MR  4. Mild to moderate AI  5. Mild atheroma in descending aorta  6. NO evidence of vegetation    Plan  1. No change in cardiac management  2. Hold coumadin tonight given increase in INR, goal 2-3 and given dense SEC recommend at higher end of range  3. Further management per primary team and ID  4. thank you     Jaren Nicholas MD

## 2019-06-19 NOTE — CONSULT NOTE ADULT - SUBJECTIVE AND OBJECTIVE BOX
full consult to follow     consult requested by Dr Arango this AM   hyperglcyemia ABELINO cancleed yessterdy for severe hyp[erglcyemia   pt on steroids for gout   BS right now 286 but was 500 earlier  will give  10 units Lantus now    Keep evening Lantus Premeal Humalog and sliding scale full consult to follow     consult requested by Dr Arango this AM   hyperglcyemia ABELINO cancleed yessterdy for severe hyp[erglcyemia   pt on steroids for gout   BS right now 286 but was 500 earlier  will give  10 units Lantus now    Keep evening Lantus Premeal Humalog and sliding scale     HPI:  80 y/o male with h/o A Fib on coumadin CHF, Gout and arthritis who was admitted to [facility] about 9 days ago for incapacitation and unable to ambulate. patient developed SOB and chills this morning. no cough. no headache nausea or vomiting. no diarrhea or dysuria but frequent urination. In the ER, Temp: 101.2. O2 was 87, improved with O2 NC . CXR showed right upper and lower pneumonia. (2019 11:55)  Pt seen with daughter at bedside   Did have ABELINO today    BS have been better controlled since addition of extra insulin this AM       PAST MEDICAL & SURGICAL HISTORY:  Gout  DiabetesT2 x 30 years   Takes 20 unit sof Levemir once per day ( if sugar is above 200 otherwise may take less  )  and Januvia once per day  No microvascualr complicaitons  tests at home - typically in low 100's   CRI (chronic renal insufficiency)  Chronic CHF  Atrial fibrillation  Heart murmur  Valvular heart disease  Iron deficiency anemia  Gastroesophageal reflux disease without esophagitis  Hyperlipidemia  Essential hypertension  Transient cerebral ischemia, unspecified transient cerebral ischemia type    PSH  arthroscopic knee surgery  cataract bilateral     FAMILY HISTORY:  Dad lung CA   children healthy        SOCIAL HISTORY: ever smoked  rare Etoh     REVIEW OF SYSTEMS:    Constitutional: No fever, no chills, no change in weight.        Neck: No neck pain, no change in voice.    Lungs: No shortness of breath, no wheezing, no cough    CV: No chest pain, no palpitations, no pain with walking.    GI: No nausea, no vomiting, no constipation, no diarrhea, no abdominal pain    : No urinary frequency, no blood in urine, no urinary burning, no difficulty voiding.    Musculoskeletal: knees still very tender to touch     Skin: No rash, no infections.    Neurologic: No headaches, no weakness, no burning or pain in feet, no tremor.    Endocrine: No heat intolerance, no cold intolerance, no increased sweating, no shakiness between meals.    Psych: No depression, no anxiety, no trouble concentrating    MEDICATIONS  (STANDING):  allopurinol 100 milliGRAM(s) Oral daily  ampicillin/sulbactam  IVPB 3 Gram(s) IV Intermittent every 8 hours  aspirin enteric coated 81 milliGRAM(s) Oral daily  atorvastatin 40 milliGRAM(s) Oral at bedtime  chlorhexidine 2% Cloths 1 Application(s) Topical daily  dextrose 5%. 1000 milliLiter(s) (50 mL/Hr) IV Continuous <Continuous>  dextrose 50% Injectable 12.5 Gram(s) IV Push once  dextrose 50% Injectable 25 Gram(s) IV Push once  dextrose 50% Injectable 25 Gram(s) IV Push once  gabapentin 100 milliGRAM(s) Oral three times a day  insulin glargine Injectable (LANTUS) 25 Unit(s) SubCutaneous at bedtime  insulin glargine Injectable (LANTUS) 10 Unit(s) SubCutaneous every morning  insulin lispro (HumaLOG) corrective regimen sliding scale   SubCutaneous three times a day before meals  insulin lispro Injectable (HumaLOG) 7 Unit(s) SubCutaneous three times a day before meals  metoprolol succinate  milliGRAM(s) Oral daily  pantoprazole    Tablet 40 milliGRAM(s) Oral before breakfast  predniSONE   Tablet 40 milliGRAM(s) Oral daily    MEDICATIONS  (PRN):  acetaminophen   Tablet .. 650 milliGRAM(s) Oral every 6 hours PRN Temp greater or equal to 38C (100.4F), Mild Pain (1 - 3), Moderate Pain (4 - 6)  dextrose 40% Gel 15 Gram(s) Oral once PRN Blood Glucose LESS THAN 70 milliGRAM(s)/deciliter  glucagon  Injectable 1 milliGRAM(s) IntraMuscular once PRN Glucose LESS THAN 70 milligrams/deciliter  traMADol 25 milliGRAM(s) Oral four times a day PRN Moderate Pain (4 - 6)      Allergies    No Known Allergies    Intolerances          CAPILLARY BLOOD GLUCOSE  INTERVAL HPI/OVERNIGHT EVENTS:    MEDICATIONS  (STANDING):  allopurinol 100 milliGRAM(s) Oral daily  ampicillin/sulbactam  IVPB 3 Gram(s) IV Intermittent every 8 hours  aspirin enteric coated 81 milliGRAM(s) Oral daily  atorvastatin 40 milliGRAM(s) Oral at bedtime  chlorhexidine 2% Cloths 1 Application(s) Topical daily  dextrose 5%. 1000 milliLiter(s) (50 mL/Hr) IV Continuous <Continuous>  dextrose 50% Injectable 12.5 Gram(s) IV Push once  dextrose 50% Injectable 25 Gram(s) IV Push once  dextrose 50% Injectable 25 Gram(s) IV Push once  gabapentin 100 milliGRAM(s) Oral three times a day  insulin glargine Injectable (LANTUS) 25 Unit(s) SubCutaneous at bedtime  insulin glargine Injectable (LANTUS) 10 Unit(s) SubCutaneous every morning  insulin lispro (HumaLOG) corrective regimen sliding scale   SubCutaneous three times a day before meals  insulin lispro Injectable (HumaLOG) 7 Unit(s) SubCutaneous three times a day before meals  metoprolol succinate  milliGRAM(s) Oral daily  pantoprazole    Tablet 40 milliGRAM(s) Oral before breakfast  predniSONE   Tablet 40 milliGRAM(s) Oral daily    MEDICATIONS  (PRN):  acetaminophen   Tablet .. 650 milliGRAM(s) Oral every 6 hours PRN Temp greater or equal to 38C (100.4F), Mild Pain (1 - 3), Moderate Pain (4 - 6)  dextrose 40% Gel 15 Gram(s) Oral once PRN Blood Glucose LESS THAN 70 milliGRAM(s)/deciliter  glucagon  Injectable 1 milliGRAM(s) IntraMuscular once PRN Glucose LESS THAN 70 milligrams/deciliter  traMADol 25 milliGRAM(s) Oral four times a day PRN Moderate Pain (4 - 6)      Allergies    No Known Allergies    Intolerances        Review of systems:    Vital Signs Last 24 Hrs  T(C): 36.8 (2019 16:02), Max: 36.8 (2019 16:02)  T(F): 98.3 (2019 16:02), Max: 98.3 (2019 16:02)  HR: 69 (2019 16:02) (56 - 69)  BP: 148/70 (2019 16:02) (148/70 - 176/79)  BP(mean): --  RR: 18 (2019 16:02) (16 - 19)  SpO2: 97% (2019 16:02) (97% - 98%)    PHYSICAL EXAM:      Constitutional: NAD, well-groomed, well-developed  Neck: No LAD, No JVD, trachea midline, no thyroid enlargement  Respiratory: CTAB  Cardiovascular: S1 and S2, RRR, no M/G/R  Gastrointestinal: BS+, soft,s  Extremities: No peripheral edema, no pedal lesions  Vascular: 2+ peripheral pulses  Neurological: A/O x 3, no focal deficits  Psychiatric: Normal mood, normal affect  Musculoskeletal: tender skin over knees and shin area  s        LABS:                        9.1    10.2  )-----------( 195      ( 2019 05:24 )             27.6     06-19    134<L>  |  98  |  69.0<H>  ----------------------------<  389<H>  4.5   |  24.0  |  2.41<H>    Ca    9.2      2019 05:24      Urinalysis Basic - ( 2019 07:32 )    Color: Yellow / Appearance: Clear / S.015 / pH: x  CAPILLARY BLOOD GLUCOSE      POCT Blood Glucose.: 368 mg/dL (2019 22:28)  POCT Blood Glucose.: 291 mg/dL (2019 17:38)  POCT Blood Glucose.: 145 mg/dL (2019 13:54)  POCT Blood Glucose.: 195 mg/dL (2019 09:32)  POCT Blood Glucose.: 288 mg/dL (2019 07:31)  POCT Blood Glucose.: 365 mg/dL (2019 06:03)  POCT Blood Glucose.: 411 mg/dL (2019 04:24)  POCT Blood Glucose.: 454 mg/dL (2019 03:  POCT Blood Glucose.: 467 mg/dL (2019 01:52)  POCT Blood Glucose.: 506 mg/dL (2019 00:25)      Gluc: x / Ketone: Negative  / Bili: Negative / Urobili: Negative mg/dL   Blood: x / Protein: 15 mg/dL / Nitrite: Negative   Leuk Esterase: Negative / RBC: Negative /HPF / WBC 0-2   Sq Epi: x / Non Sq Epi: Few / Bacteria: Few                PHYSICAL EXAM:    Vital Signs Last 24 Hrs  T(C): 36.8 (2019 16:02), Max: 36.8 (2019 16:02)  T(F): 98.3 (2019 16:02), Max: 98.3 (2019 16:02)  HR: 69 (2019 16:02) (56 - 69)  BP: 148/70 (2019 16:02) (148/70 - 176/79)  BP(mean): --  RR: 18 (2019 16:02) (16 - 19)  SpO2: 97% (2019 16:02) (97% - 98%)    General appearance: Well developed, well nourished.    Neck: Trachea midline. No thyroid enlargement.    Lungs: Normal respiratory excursion. Lungs clear.    CV: Regular cardiac rhythm. No murmur. Carotid and pedal pulses intact.        Musculoskeletal: knees swollen  but apparently better   Leges less edematous per family   Skin: Warm and moist. No rash. No acanthosis.    Neuro: Cranial nerves intact. Normal motor and sensory function. DTR's normal.    Psych: Normal affect, good judgement.            LABS:                        9.1    10.2  )-----------( 195      ( 2019 05:24 )             27.6     06-19    134<L>  |  98  |  69.0<H>  ----------------------------<  389<H>  4.5   |  24.0  |  2.41<H>    Ca    9.2      2019 05:24      Urinalysis Basic - ( 2019 07:32 )    Color: Yellow / Appearance: Clear / S.015 / pH: x  Gluc: x / Ketone: Negative  / Bili: Negative / Urobili: Negative mg/dL   Blood: x / Protein: 15 mg/dL / Nitrite: Negative   Leuk Esterase: Negative / RBC: Negative /HPF / WBC 0-2   Sq Epi: x / Non Sq Epi: Few / Bacteria: Few

## 2019-06-19 NOTE — CHART NOTE - NSCHARTNOTEFT_GEN_A_CORE
CC: persistent hyperglycemia  INTERVAL HPI: Called by RN that blood sugar was 526 at bedtime check. s/p standing Humalog 15 units, sugars 506. Given total of 10 units of Humalog additionally with minimal effect. Pt seen and examined at bedside. Pt is sleeping comfortably at bedside. Stating that he feels well, is awaiting ABELINO. Denies abdominal pain, epigastric pain, nausea, vomiting, diarrhea, increased thirst or increased appetite. Pt states that pain in B/L knees from gout flare up have improved with PO steroids. No other complaints.     REVIEW OF SYSTEMS:  CONSTITUTIONAL: No fever, weight loss, or fatigue  EYES: No eye pain, visual disturbances, or discharge  ENT:  No difficulty hearing, tinnitus, vertigo; No sinus or throat pain  NECK: No pain or stiffness  RESPIRATORY: No cough, wheezing, chills or hemoptysis; No shortness of breath  CARDIOVASCULAR: No chest pain, palpitations, dizziness, or leg swelling  GASTROINTESTINAL: see HPI  GENITOURINARY: No dysuria, frequency, hematuria, or incontinence  NEUROLOGICAL: No headaches, memory loss, loss of strength, numbness, or tremors  SKIN: No itching, burning, rashes, or lesions   MUSCULOSKELETAL: No muscle, back, or extremity pain    Allergies  No Known Allergies    HEALTH ISSUES - PROBLEM Dx:  Encounter for palliative care: Encounter for palliative care  Acute gout of right knee, unspecified cause: Acute gout of right knee, unspecified cause  Enterococcal bacteremia: Enterococcal bacteremia  Pneumonia: Pneumonia  CRI (chronic renal insufficiency), stage 3 (moderate): CRI (chronic renal insufficiency), stage 3 (moderate)  Type 2 diabetes mellitus with complication, with long-term current use of insulin: Type 2 diabetes mellitus with complication, with long-term current use of insulin  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, unspecified site: 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, unspecified site  Essential hypertension: Essential hypertension  Chronic congestive heart failure, unspecified heart failure type: Chronic congestive heart failure, unspecified heart failure type  Paroxysmal A-fib: Paroxysmal A-fib  Sepsis, due to unspecified organism: Sepsis, due to unspecified organism  Pneumonia of right lung due to infectious organism, unspecified part of lung: Pneumonia of right lung due to infectious organism, unspecified part of lung    PAST MEDICAL & SURGICAL HISTORY:  Gout  Diabetes  CRI (chronic renal insufficiency)  Chronic CHF  Atrial fibrillation  Heart murmur  Valvular heart disease  Iron deficiency anemia  Gastroesophageal reflux disease without esophagitis  Hyperlipidemia  Essential hypertension  Transient cerebral ischemia, unspecified transient cerebral ischemia type  No significant past surgical history    VITAL SIGNS:  T(C): 36.8 (06-18-19 @ 23:41), Max: 36.9 (06-18-19 @ 08:05)  HR: 63 (06-18-19 @ 23:41) (63 - 71)  BP: 161/84 (06-18-19 @ 23:41) (138/74 - 161/84)  RR: 20 (06-18-19 @ 16:50) (18 - 20)  SpO2: 93% (06-18-19 @ 16:50) (93% - 93%)  Wt(kg): --Vital Signs Last 24 Hrs  T(C): 36.8 (18 Jun 2019 23:41), Max: 36.9 (18 Jun 2019 08:05)  T(F): 98.2 (18 Jun 2019 23:41), Max: 98.5 (18 Jun 2019 08:05)  HR: 63 (18 Jun 2019 23:41) (63 - 71)  BP: 161/84 (18 Jun 2019 23:41) (138/74 - 161/84)  BP(mean): --  RR: 20 (18 Jun 2019 16:50) (18 - 20)  SpO2: 93% (18 Jun 2019 16:50) (93% - 93%)    PHYSICAL EXAM:  GENERAL: Nontoxic appearing, elderly male, lying in bed comfortably in NAD  HEAD:  Atraumatic, Normocephalic  EYES: EOMI, PERRLA, conjunctiva and sclera clear  ENT: Moist mucous membranes  NECK: Supple, No JVD  CHEST/LUNG: Clear to auscultation bilaterally; No rales, rhonchi, wheezing, or rubs. Unlabored respirations  HEART: Regular rate and rhythm; No murmurs, rubs, or gallops  ABDOMEN: Bowel sounds present; Soft, Nontender, Nondistended. No guarding or rigidity   EXTREMITIES:  2+ Peripheral Pulses, brisk capillary refill. No clubbing, cyanosis, or edema. B/L Knees mildly tender to touch   NERVOUS SYSTEM:  Alert & Oriented X3, speech clear, FROM x 4 extremities. No deficits   SKIN: No rashes or lesions, warm and dry    LABS:               9.1    10.2  )-----------( 195      ( 19 Jun 2019 05:24 )             27.6     06-19    134<L>  |  98  |  69.0<H>  ----------------------------<  389<H>  4.5   |  24.0  |  2.41<H>    Ca    9.2      19 Jun 2019 05:24  Mg     1.8     06-17    PT/INR - ( 19 Jun 2019 05:24 )   PT: 48.3 sec;   INR: 4.02 ratio    PTT - ( 19 Jun 2019 05:24 )  PTT:38.4 sec    CAPILLARY BLOOD GLUCOSE  POCT Blood Glucose.: 365 mg/dL (19 Jun 2019 06:03)  POCT Blood Glucose.: 411 mg/dL (19 Jun 2019 04:24)  POCT Blood Glucose.: 454 mg/dL (19 Jun 2019 03:02)  POCT Blood Glucose.: 467 mg/dL (19 Jun 2019 01:52)  POCT Blood Glucose.: 506 mg/dL (19 Jun 2019 00:25)  POCT Blood Glucose.: 526 mg/dL (18 Jun 2019 23:01)  POCT Blood Glucose.: 510 mg/dL (18 Jun 2019 22:58)  POCT Blood Glucose.: 443 mg/dL (18 Jun 2019 16:21)  POCT Blood Glucose.: 398 mg/dL (18 Jun 2019 11:48)  POCT Blood Glucose.: 375 mg/dL (18 Jun 2019 07:45)    ASSESSMENT/ PLAN: 62 year old male with PMH HTN, dyslipidemia, ESRD on HD, DM, CAD s/p CABG, CKD3, gout admitted for pneumonia and enterococcus bacteremia. Pt currently on PO Prednisone for gout flare up. Pt scheduled to go for ABELINO today. ABELINO canceled yesterday for hyperglycemia. Latus and premeal adjusted yesterday but still hyperglycemic   - s/p total of Humalog 10units tonight with minimal effect   - Reg insulin 10mg IVP x1, 500cc bolus NS, maintenance NS IVF after bolus  - TTE noted. EF 60-65%. No evidence of volume overload  - INR 4.2 noted. Recommend holding coumadin. No active signs of bleeding   - Accucheck 365 prior to Humulin   -BMP noted. BUN/ Cr slight increase. Continue IVF  -UA ordered to assess for ketones and glucose   -please consider endocrinology consult  -recheck BS 1 hour   -case d/w Nocturnist Dr Solis. In agreement with plan, no further recommendations   -continue to monitor closely, will reassess prn CC: persistent hyperglycemia  INTERVAL HPI: Called by RN that blood sugar was 526 at bedtime check. s/p standing Humalog 15 units, sugars 506. Given total of 10 units of Humalog additionally with minimal effect. Pt seen and examined at bedside. Pt is sleeping comfortably at bedside. Stating that he feels well, is awaiting ABELINO. Denies abdominal pain, epigastric pain, nausea, vomiting, diarrhea, increased thirst or increased appetite. Pt states that pain in B/L knees from gout flare up have improved with PO steroids. No other complaints.     REVIEW OF SYSTEMS:  CONSTITUTIONAL: No fever, weight loss, or fatigue  EYES: No eye pain, visual disturbances, or discharge  ENT:  No difficulty hearing, tinnitus, vertigo; No sinus or throat pain  NECK: No pain or stiffness  RESPIRATORY: No cough, wheezing, chills or hemoptysis; No shortness of breath  CARDIOVASCULAR: No chest pain, palpitations, dizziness, or leg swelling  GASTROINTESTINAL: see HPI  GENITOURINARY: No dysuria, frequency, hematuria, or incontinence  NEUROLOGICAL: No headaches, memory loss, loss of strength, numbness, or tremors  SKIN: No itching, burning, rashes, or lesions   MUSCULOSKELETAL: No muscle, back, or extremity pain    Allergies  No Known Allergies    HEALTH ISSUES - PROBLEM Dx:  Encounter for palliative care: Encounter for palliative care  Acute gout of right knee, unspecified cause: Acute gout of right knee, unspecified cause  Enterococcal bacteremia: Enterococcal bacteremia  Pneumonia: Pneumonia  CRI (chronic renal insufficiency), stage 3 (moderate): CRI (chronic renal insufficiency), stage 3 (moderate)  Type 2 diabetes mellitus with complication, with long-term current use of insulin: Type 2 diabetes mellitus with complication, with long-term current use of insulin  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, unspecified site: 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, unspecified site  Essential hypertension: Essential hypertension  Chronic congestive heart failure, unspecified heart failure type: Chronic congestive heart failure, unspecified heart failure type  Paroxysmal A-fib: Paroxysmal A-fib  Sepsis, due to unspecified organism: Sepsis, due to unspecified organism  Pneumonia of right lung due to infectious organism, unspecified part of lung: Pneumonia of right lung due to infectious organism, unspecified part of lung    PAST MEDICAL & SURGICAL HISTORY:  Gout  Diabetes  CRI (chronic renal insufficiency)  Chronic CHF  Atrial fibrillation  Heart murmur  Valvular heart disease  Iron deficiency anemia  Gastroesophageal reflux disease without esophagitis  Hyperlipidemia  Essential hypertension  Transient cerebral ischemia, unspecified transient cerebral ischemia type  No significant past surgical history    VITAL SIGNS:  T(C): 36.8 (06-18-19 @ 23:41), Max: 36.9 (06-18-19 @ 08:05)  HR: 63 (06-18-19 @ 23:41) (63 - 71)  BP: 161/84 (06-18-19 @ 23:41) (138/74 - 161/84)  RR: 20 (06-18-19 @ 16:50) (18 - 20)  SpO2: 93% (06-18-19 @ 16:50) (93% - 93%)  Wt(kg): --Vital Signs Last 24 Hrs  T(C): 36.8 (18 Jun 2019 23:41), Max: 36.9 (18 Jun 2019 08:05)  T(F): 98.2 (18 Jun 2019 23:41), Max: 98.5 (18 Jun 2019 08:05)  HR: 63 (18 Jun 2019 23:41) (63 - 71)  BP: 161/84 (18 Jun 2019 23:41) (138/74 - 161/84)  BP(mean): --  RR: 20 (18 Jun 2019 16:50) (18 - 20)  SpO2: 93% (18 Jun 2019 16:50) (93% - 93%)    PHYSICAL EXAM:  GENERAL: Nontoxic appearing, elderly male, lying in bed comfortably in NAD  HEAD:  Atraumatic, Normocephalic  EYES: EOMI, PERRLA, conjunctiva and sclera clear  ENT: Moist mucous membranes  NECK: Supple, No JVD  CHEST/LUNG: Clear to auscultation bilaterally; No rales, rhonchi, wheezing, or rubs. Unlabored respirations  HEART: Regular rate and rhythm; No murmurs, rubs, or gallops  ABDOMEN: Bowel sounds present; Soft, Nontender, Nondistended. No guarding or rigidity   EXTREMITIES:  2+ Peripheral Pulses, brisk capillary refill. No clubbing, cyanosis, or edema. B/L Knees mildly tender to touch   NERVOUS SYSTEM:  Alert & Oriented X3, speech clear, FROM x 4 extremities. No deficits   SKIN: No rashes or lesions, warm and dry    LABS:               9.1    10.2  )-----------( 195      ( 19 Jun 2019 05:24 )             27.6     06-19    134<L>  |  98  |  69.0<H>  ----------------------------<  389<H>  4.5   |  24.0  |  2.41<H>    Ca    9.2      19 Jun 2019 05:24  Mg     1.8     06-17    PT/INR - ( 19 Jun 2019 05:24 )   PT: 48.3 sec;   INR: 4.02 ratio    PTT - ( 19 Jun 2019 05:24 )  PTT:38.4 sec    CAPILLARY BLOOD GLUCOSE  POCT Blood Glucose.: 365 mg/dL (19 Jun 2019 06:03)  POCT Blood Glucose.: 411 mg/dL (19 Jun 2019 04:24)  POCT Blood Glucose.: 454 mg/dL (19 Jun 2019 03:02)  POCT Blood Glucose.: 467 mg/dL (19 Jun 2019 01:52)  POCT Blood Glucose.: 506 mg/dL (19 Jun 2019 00:25)  POCT Blood Glucose.: 526 mg/dL (18 Jun 2019 23:01)  POCT Blood Glucose.: 510 mg/dL (18 Jun 2019 22:58)  POCT Blood Glucose.: 443 mg/dL (18 Jun 2019 16:21)  POCT Blood Glucose.: 398 mg/dL (18 Jun 2019 11:48)  POCT Blood Glucose.: 375 mg/dL (18 Jun 2019 07:45)    ASSESSMENT/ PLAN: 62 year old male with PMH HTN, dyslipidemia, ESRD on HD, DM, CAD s/p CABG, CKD3, gout admitted for pneumonia and enterococcus bacteremia. Pt currently on PO Prednisone for gout flare up. Pt scheduled to go for ABELINO today. ABELINO canceled yesterday for hyperglycemia. Latus and premeal adjusted yesterday but still hyperglycemic   - s/p total of Humalog 10units tonight with minimal effect   - Reg insulin 10mg IVP x1, 500cc bolus NS, maintenance NS IVF after bolus  - TTE noted. EF 60-65%. No evidence of volume overload  - INR 4.2 noted. Recommend holding coumadin. No active signs of bleeding   - Accucheck 365 prior to Humulin   -BMP noted. BUN/ Cr slight increase. Continue IVF  -consider decreasing prednisone   -UA ordered to assess for ketones and glucose   -please consider endocrinology consult  -recheck BS 1 hour   -case d/w Nocturnist Dr Solis. In agreement with plan, no further recommendations   -continue to monitor closely, will reassess prn. Signed out to PA

## 2019-06-19 NOTE — PROCEDURE NOTE - NSICDXPROCEDURE_GEN_ALL_CORE_FT
PROCEDURES:  US guided vascular access 19-Jun-2019 13:26:24 Patent right basilic vein Nolan Howard  US guided needle placement 19-Jun-2019 13:26:11 RUE Nolan Howard  Placement, PICC, with imaging guidance 19-Jun-2019 13:25:38 4FR  44CM length  29CIRC INWEBTURE Limited POWER PICC LINE ns flush good heme back right basilic vein Nolan Howard

## 2019-06-19 NOTE — PROGRESS NOTE ADULT - ASSESSMENT
79y Male with chronic AF, HTN, CKD, gout, HLD here with shortness of breath due to PNA. Has enterococcus bateremia, ? GI source. Will need for ABELINO to ensure no evidence endocarditis. TTE fairly unremarkable and no gross evidence of vegetation.     ASA III  Mallampati - per anesthesia   NPO since MN  GFR - 25

## 2019-06-19 NOTE — PROCEDURE NOTE - NSPROCDETAILS_GEN_ALL_CORE
ultrasound assessment/sterile dressing applied/supine position/sterile technique, catheter placed/location identified, draped/prepped, sterile technique used/ultrasound guidance

## 2019-06-19 NOTE — PROGRESS NOTE ADULT - ASSESSMENT
CKD(IV):  MENDY: vol depletion from diuretics==> resolving off diuretics  Hyponatremia  + hyperglycemia  PNA with enteroccal bacteremia  -  would cont to hold diuretics for now  - needs glucose optimization  - avoid potential nephrotoxins  - watch labs  - ABELINO rescheduled for today to r/o SBE

## 2019-06-19 NOTE — PROCEDURE NOTE - NSPOSTPRCRAD_GEN_A_CORE
chest radiograph/depth of insertion/line adjusted to depth of insertion/ultrasound/postion of catheter/line in appropriate postion

## 2019-06-19 NOTE — PROGRESS NOTE ADULT - ASSESSMENT
78 y/o male with h/o A Fib on coumadin CHF, Gout and arthritis who was admitted to [facility] about 9 days ago for incapacitation and unable to ambulate. patient developed SOB and chills this morning. no cough. no headache nausea or vomiting. no diarrhea or dysuria but frequent urination. In the ER, Temp: 101.2. O2 was 87, improved with O2 NC . CXR showed right upper and lower pneumonia.   PT WITH BLOOD CX ENTEROCOCCAL BACTEREMIA   on  AMPICILLIN   ECHO NEGATIVE BUT   AWAITING ABELINO       POSSIBLE PNEUMONIA ON     UNASYN    IF ENDOCARDITIS  WOULD NEED  AMP/ROCEPHIN TX   WILL FOLLOW UP D/W HOSPITLSIT  CONTINUE CURRENT REGIMEN

## 2019-06-19 NOTE — PROGRESS NOTE ADULT - SUBJECTIVE AND OBJECTIVE BOX
NEPHROLOGY INTERVAL HPI/OVERNIGHT EVENTS:  pt comfortable  no cp, sob, n/v/d    MEDICATIONS  (STANDING):  allopurinol 100 milliGRAM(s) Oral daily  ampicillin/sulbactam  IVPB 3 Gram(s) IV Intermittent every 8 hours  aspirin enteric coated 81 milliGRAM(s) Oral daily  atorvastatin 40 milliGRAM(s) Oral at bedtime  chlorhexidine 2% Cloths 1 Application(s) Topical daily  dextrose 5%. 1000 milliLiter(s) (50 mL/Hr) IV Continuous <Continuous>  dextrose 50% Injectable 12.5 Gram(s) IV Push once  dextrose 50% Injectable 25 Gram(s) IV Push once  dextrose 50% Injectable 25 Gram(s) IV Push once  gabapentin 100 milliGRAM(s) Oral three times a day  insulin glargine Injectable (LANTUS) 25 Unit(s) SubCutaneous at bedtime  insulin lispro (HumaLOG) corrective regimen sliding scale   SubCutaneous three times a day before meals  insulin lispro Injectable (HumaLOG) 7 Unit(s) SubCutaneous three times a day before meals  metoprolol succinate  milliGRAM(s) Oral daily  pantoprazole    Tablet 40 milliGRAM(s) Oral before breakfast  predniSONE   Tablet 40 milliGRAM(s) Oral daily  saccharomyces boulardii 250 milliGRAM(s) Oral two times a day  sodium chloride 0.9%. 1000 milliLiter(s) (100 mL/Hr) IV Continuous <Continuous>    MEDICATIONS  (PRN):  acetaminophen   Tablet .. 650 milliGRAM(s) Oral every 6 hours PRN Temp greater or equal to 38C (100.4F), Mild Pain (1 - 3), Moderate Pain (4 - 6)  dextrose 40% Gel 15 Gram(s) Oral once PRN Blood Glucose LESS THAN 70 milliGRAM(s)/deciliter  glucagon  Injectable 1 milliGRAM(s) IntraMuscular once PRN Glucose LESS THAN 70 milligrams/deciliter  traMADol 25 milliGRAM(s) Oral four times a day PRN Severe Pain (7 - 10)      Allergies    No Known Allergies          Vital Signs Last 24 Hrs  T(C): 36.8 (18 Jun 2019 23:41), Max: 36.9 (18 Jun 2019 08:05)  T(F): 98.2 (18 Jun 2019 23:41), Max: 98.5 (18 Jun 2019 08:05)  HR: 63 (18 Jun 2019 23:41) (63 - 71)  BP: 161/84 (18 Jun 2019 23:41) (138/74 - 161/84)  BP(mean): --  RR: 20 (18 Jun 2019 16:50) (18 - 20)  SpO2: 93% (18 Jun 2019 16:50) (93% - 93%)    PHYSICAL EXAM:  Comfortable  NECK: Supple, No JVD/Bruit  NERVOUS SYSTEM:  A/O x3, non focal  CHEST: Clear; diminished BS at bases  HEART:  RRR, No rub  ABDOMEN: Soft, NT/ND BS+  EXTREMITIES: Tr ankle edema; B/L LE varicosities  SKIN: No rashes    LABS:                        9.1    10.2  )-----------( 195      ( 19 Jun 2019 05:24 )             27.6     06-19    134<L>  |  98  |  69.0<H>  ----------------------------<  389<H>  4.5   |  24.0  |  2.41<H>    Creatinine, Serum: 2.48 mg/dL (06.18.19 @ 07:44)        Ca    9.2      19 Jun 2019 05:24  Mg     1.8     06-17      PT/INR - ( 19 Jun 2019 05:24 )   PT: 48.3 sec;   INR: 4.02 ratio         PTT - ( 19 Jun 2019 05:24 )  PTT:38.4 sec        RADIOLOGY & ADDITIONAL TESTS:

## 2019-06-19 NOTE — PROGRESS NOTE ADULT - SUBJECTIVE AND OBJECTIVE BOX
[facility]  INFECTIOUS DISEASES AND INTERNAL MEDICINE at [place]  =======================================================  Francisco Gomez MD  Diplomates American Board of Internal Medicine and Infectious Diseases  =======================================================    TAI IFEANYI [id number]    Follow up: enterococcal bacteremia    Allergies:  No Known Allergies      Medications:  acetaminophen   Tablet .. 650 milliGRAM(s) Oral every 6 hours PRN  allopurinol 100 milliGRAM(s) Oral daily  ampicillin  IVPB      ampicillin  IVPB 2 Gram(s) IV Intermittent every 8 hours  aspirin enteric coated 81 milliGRAM(s) Oral daily  atorvastatin 40 milliGRAM(s) Oral at bedtime  dextrose 40% Gel 15 Gram(s) Oral once PRN  dextrose 5%. 1000 milliLiter(s) IV Continuous <Continuous>  dextrose 50% Injectable 12.5 Gram(s) IV Push once  dextrose 50% Injectable 25 Gram(s) IV Push once  dextrose 50% Injectable 25 Gram(s) IV Push once  furosemide    Tablet 40 milliGRAM(s) Oral daily  gabapentin 100 milliGRAM(s) Oral three times a day  glucagon  Injectable 1 milliGRAM(s) IntraMuscular once PRN  insulin glargine Injectable (LANTUS) 15 Unit(s) SubCutaneous at bedtime  insulin lispro (HumaLOG) corrective regimen sliding scale   SubCutaneous three times a day before meals  insulin lispro (HumaLOG) corrective regimen sliding scale   SubCutaneous at bedtime  metoprolol succinate  milliGRAM(s) Oral daily  pantoprazole    Tablet 40 milliGRAM(s) Oral before breakfast  saccharomyces boulardii 250 milliGRAM(s) Oral two times a day  traMADol 25 milliGRAM(s) Oral four times a day PRN  warfarin 5 milliGRAM(s) Oral once    SOCIAL       FAMILY   FAMILY HISTORY:    REVIEW OF SYSTEMS:  CONSTITUTIONAL:  No Fever or chills  HEENT:   No diplopia or blurred vision.  No earache, sore throat or runny nose.  CARDIOVASCULAR:  No pressure, squeezing, strangling, tightness, heaviness or aching about the chest, neck, axilla or epigastrium.  RESPIRATORY:  No cough, shortness of breath, PND or orthopnea.  GASTROINTESTINAL:  No nausea, vomiting or diarrhea.  GENITOURINARY:  No dysuria, frequency or urgency. No Blood in urine  MUSCULOSKELETAL:   AS PER HPI  SKIN:  No change in skin, hair or nails.  NEUROLOGIC:  No paresthesias, fasciculations, seizures or weakness.  PSYCHIATRIC:  No disorder of thought or mood.  ENDOCRINE:  No heat or cold intolerance, polyuria or polydipsia.  HEMATOLOGICAL:  No easy bruising or bleeding.            Physical Exam:     Vital Signs Last 24 Hrs  T(C): 36.6 (19 Jun 2019 07:54), Max: 36.8 (18 Jun 2019 23:41)  T(F): 97.8 (19 Jun 2019 07:54), Max: 98.2 (18 Jun 2019 23:41)  HR: 56 (19 Jun 2019 07:54) (56 - 68)  BP: 176/68 (19 Jun 2019 07:54) (138/74 - 176/68)  BP(mean): --  RR: 19 (19 Jun 2019 07:54) (19 - 20)  SpO2: 97% (19 Jun 2019 07:54) (93% - 97%)      GEN: NAD, pleasant  HEENT: normocephalic and atraumatic. EOMI. JOSE.    NECK: Supple. No carotid bruits.  No lymphadenopathy or thyromegaly.  LUNGS: Clear to auscultation.  HEART: Regular rate and rhythm without murmur.  ABDOMEN: Soft, nontender, and nondistended.  Positive bowel sounds.    : No CVA tenderness  EXTREMITIES: Without any cyanosis, clubbing, rash, lesions or edema.  MSK: no joint swelling  NEUROLOGIC: Cranial nerves II through XII are grossly intact.  PSYCHIATRIC: Appropriate affect .  SKIN: No ulceration or induration present.        Labs:                                      9.1    10.2  )-----------( 195      ( 19 Jun 2019 05:24 )             27.6   06-19    134<L>  |  98  |  69.0<H>  ----------------------------<  389<H>  4.5   |  24.0  |  2.41<H>    Ca    9.2      19 Jun 2019 05:24            RECENT CULTURES:  06-12 @ 09:23 .Blood Enterococcus faecalis  Blood Culture PCR    Growth in aerobic and anaerobic bottles: Enterococcus faecalis  Aerobic Bottle: 11.56 Hours to positivity  Anaerobic Bottle: 13:26 Hours to positivity  .  ***Blood Panel PCR results on this specimen are available  approximately 3 hours after the Gram stain result.***  Gram stain, PCR, and/or culture results may not always  correspond due to difference in methodologies.  ************************************************************  This PCR assay was performed using [facility] [employer].  The following targets are tested for: Enterococcus,  vancomycin resistant enterococci, Listeria monocytogenes,  coagulase negative staphylococci, S. aureus,  methicillin resistant S. aureus, Streptococcus agalactiae  (Group B), S. pneumoniae, S. pyogenes (Group A),  Acinetobacter baumannii, Enterobacter cloacae, E. coli,  Klebsiella oxytoca, K. pneumoniae, Proteus sp.,  Serratia marcescens, Haemophilus influenzae,  Neisseria meningitidis, Pseudomonas aeruginosa, Candida  albicans, C. glabrata, C krusei, C parapsilosis,  C. tropicalis and the KPC resistance gene.  "Due to technical problems, Proteus sp. will Not be reported as part of  the BCID panel until further notice"  .  TYPE: (C=Critical, N=Notification, A=Abnormal) C  TESTS:  _ GS BLD  DATE/TIME CALLED: _ 06/12/2019 22:53:24  CALLED TO: Maria Dolores RANGEL  READ BACK (2 Patient Identifiers)(Y/N): _ Y  READ BACK VALUES (Y/N): _ Y  CALLED BY: Maria Dolores BACK        06-12 @ 09:22 .Blood Enterococcus faecalis    Growth in aerobic and anaerobic bottles: Enterococcus faecalis  Aerobic Bottle: 12:26 Hours to positivity  Anaerobic Bottle: 12:36 Hours to positivity  .  TYPE: (C=Critical, N=Notification, A=Abnormal) C  TESTS:  _ Positive Blood GS  DATE/TIME CALLED: _ 06/13/2019 09:50  CALLED TO: _ 4TWR: Maude Matias RN  READ BACK (2 Patient Identifiers)(Y/N): _ Y  READ BACK VALUES (Y/N): _ Y  CALLED BY: Maria Dolores patricia        06-12 @ 09:20 .Urine     No growth

## 2019-06-19 NOTE — PROGRESS NOTE ADULT - SUBJECTIVE AND OBJECTIVE BOX
CC:  Patient is a 79y old  Male who presents with a chief complaint of SOB (2019 09:09)      HPI:  80 y/o male with h/o A Fib on coumadin CHF, Gout and arthritis who was admitted to [facility] about 9 days ago for incapacitation and unable to ambulate. patient developed SOB and chills this morning. no cough. no headache nausea or vomiting. no diarrhea or dysuria but frequent urination. In the ER, Temp: 101.2. O2 was 87, improved with O2 NC . CXR showed right upper and lower pneumonia. (2019 11:55)      ROS: All review of systems negative unless indicated otherwise below.     Lab Results:  CBC  CBC Full  -  ( 2019 05:24 )  WBC Count : 10.2 K/uL  RBC Count : 3.19 M/uL  Hemoglobin : 9.1 g/dL  Hematocrit : 27.6 %  Platelet Count - Automated : 195 K/uL  Mean Cell Volume : 86.5 fl  Mean Cell Hemoglobin : 28.5 pg  Mean Cell Hemoglobin Concentration : 33.0 g/dL  Auto Neutrophil # : x  Auto Lymphocyte # : x  Auto Monocyte # : x  Auto Eosinophil # : x  Auto Basophil # : x  Auto Neutrophil % : x  Auto Lymphocyte % : x  Auto Monocyte % : x  Auto Eosinophil % : x  Auto Basophil % : x    .		Differential:	[] Automated		[] Manual  Chemistry                        9.1    10.2  )-----------( 195      ( 2019 05:24 )             27.6     06-19    134<L>  |  98  |  69.0<H>  ----------------------------<  389<H>  4.5   |  24.0  |  2.41<H>    Ca    9.2      2019 05:24        PT/INR - ( 2019 05:24 )   PT: 48.3 sec;   INR: 4.02 ratio         PTT - ( 2019 05:24 )  PTT:38.4 sec  Urinalysis Basic - ( 2019 07:32 )    Color: Yellow / Appearance: Clear / S.015 / pH: x  Gluc: x / Ketone: Negative  / Bili: Negative / Urobili: Negative mg/dL   Blood: x / Protein: 15 mg/dL / Nitrite: Negative   Leuk Esterase: Negative / RBC: Negative /HPF / WBC 0-2   Sq Epi: x / Non Sq Epi: Few / Bacteria: Few    MICROBIOLOGY/CULTURES:  Culture Results:   No growth at 5 days. ( @ 06:35)  Culture Results:   No growth at 5 days. ( @ 06:35)    < from: TTE Echo Complete w/Doppler (19 @ 14:57) >  Summary:   1. Left ventricular ejection fraction, by visual estimation, is 60 to   65%.   2. Technically difficult study.   3. Normal global left ventricular systolic function.   4. Basal inferolateral segment is abnormal as described above.   5. Normal left ventricular internal cavity size.   6. The mitral in-flow pattern reveals no discernable A-wave, therefore   no comment on diastolic function can be made.   7. There is no evidence of pericardial effusion.   8. Mild aortic regurgitation.   9. Estimated pulmonary artery systolic pressure is 37.9 mmHg assuming a   right atrial pressure of 5 mmHg, which is consistent with borderline   pulmonary hypertension.    MD Shaji Electronically signed on 2019 at 7:51:26 PM    < end of copied text >    MEDICATIONS  (STANDING):  allopurinol 100 milliGRAM(s) Oral daily  ampicillin/sulbactam  IVPB 3 Gram(s) IV Intermittent every 8 hours  aspirin enteric coated 81 milliGRAM(s) Oral daily  atorvastatin 40 milliGRAM(s) Oral at bedtime  chlorhexidine 2% Cloths 1 Application(s) Topical daily  dextrose 5%. 1000 milliLiter(s) IV Continuous <Continuous>  dextrose 50% Injectable 12.5 Gram(s) IV Push once  dextrose 50% Injectable 25 Gram(s) IV Push once  dextrose 50% Injectable 25 Gram(s) IV Push once  gabapentin 100 milliGRAM(s) Oral three times a day  insulin glargine Injectable (LANTUS) 25 Unit(s) SubCutaneous at bedtime  insulin glargine Injectable (LANTUS) 10 Unit(s) SubCutaneous every morning  insulin lispro (HumaLOG) corrective regimen sliding scale   SubCutaneous three times a day before meals  insulin lispro Injectable (HumaLOG) 7 Unit(s) SubCutaneous three times a day before meals  metoprolol succinate  milliGRAM(s) Oral daily  pantoprazole    Tablet 40 milliGRAM(s) Oral before breakfast  predniSONE   Tablet 40 milliGRAM(s) Oral daily  saccharomyces boulardii 250 milliGRAM(s) Oral two times a day  sodium chloride 0.9%. 1000 milliLiter(s) IV Continuous <Continuous>    MEDICATIONS  (PRN):  acetaminophen   Tablet .. 650 milliGRAM(s) Oral every 6 hours PRN Temp greater or equal to 38C (100.4F), Mild Pain (1 - 3), Moderate Pain (4 - 6)  dextrose 40% Gel 15 Gram(s) Oral once PRN Blood Glucose LESS THAN 70 milliGRAM(s)/deciliter  glucagon  Injectable 1 milliGRAM(s) IntraMuscular once PRN Glucose LESS THAN 70 milligrams/deciliter  traMADol 25 milliGRAM(s) Oral four times a day PRN Severe Pain (7 - 10)    PHYSICAL EXAM:  Vital Signs Last 24 Hrs  T(C): 36.6 (2019 07:54), Max: 36.8 (2019 23:41)  T(F): 97.8 (2019 07:54), Max: 98.2 (2019 23:41)  HR: 56 (2019 07:54) (56 - 68)  BP: 176/68 (2019 07:54) (138/74 - 176/68)  BP(mean): --  RR: 19 (2019 07:54) (19 - 20)  SpO2: 97% (2019 07:54) (93% - 97%)  GENERAL: NAD, well-groomed, well-developed  HEAD:  Atraumatic, Normocephalic  NECK: Supple, No JVD  NERVOUS SYSTEM:  Alert & Oriented X3, Good concentration; Motor Strength 5/5 B/L upper and lower extremities, sensation intact  CHEST/LUNG: Clear to auscultation bilaterally, No rales, rhonchi, wheezing, or rubs  HEART: Regular rate and rhythm; s1 and s2 auscultated, No murmurs, rubs, or gallops  ABDOMEN: Soft, Nontender, Nondistended; Bowel sounds present and normoactive.   EXTREMITIES:  2+ Peripheral Pulses, No clubbing, cyanosis, or edema

## 2019-06-20 ENCOUNTER — TRANSCRIPTION ENCOUNTER (OUTPATIENT)
Age: 79
End: 2019-06-20

## 2019-06-20 ENCOUNTER — APPOINTMENT (OUTPATIENT)
Dept: ORTHOPEDIC SURGERY | Facility: CLINIC | Age: 79
End: 2019-06-20

## 2019-06-20 VITALS
OXYGEN SATURATION: 96 % | SYSTOLIC BLOOD PRESSURE: 168 MMHG | RESPIRATION RATE: 19 BRPM | DIASTOLIC BLOOD PRESSURE: 76 MMHG | HEART RATE: 62 BPM | TEMPERATURE: 98 F

## 2019-06-20 LAB
ANION GAP SERPL CALC-SCNC: 18 MMOL/L — HIGH (ref 5–17)
APTT BLD: 43.1 SEC — HIGH (ref 27.5–36.3)
BUN SERPL-MCNC: 66 MG/DL — HIGH (ref 8–20)
CALCIUM SERPL-MCNC: 8.6 MG/DL — SIGNIFICANT CHANGE UP (ref 8.6–10.2)
CHLORIDE SERPL-SCNC: 101 MMOL/L — SIGNIFICANT CHANGE UP (ref 98–107)
CO2 SERPL-SCNC: 22 MMOL/L — SIGNIFICANT CHANGE UP (ref 22–29)
CREAT SERPL-MCNC: 2.47 MG/DL — HIGH (ref 0.5–1.3)
GLUCOSE BLDC GLUCOMTR-MCNC: 242 MG/DL — HIGH (ref 70–99)
GLUCOSE BLDC GLUCOMTR-MCNC: 338 MG/DL — HIGH (ref 70–99)
GLUCOSE SERPL-MCNC: 330 MG/DL — HIGH (ref 70–115)
HCT VFR BLD CALC: 28.6 % — LOW (ref 42–52)
HGB BLD-MCNC: 9.3 G/DL — LOW (ref 14–18)
INR BLD: 4.82 RATIO — HIGH (ref 0.88–1.16)
MCHC RBC-ENTMCNC: 28.3 PG — SIGNIFICANT CHANGE UP (ref 27–31)
MCHC RBC-ENTMCNC: 32.5 G/DL — SIGNIFICANT CHANGE UP (ref 32–36)
MCV RBC AUTO: 86.9 FL — SIGNIFICANT CHANGE UP (ref 80–94)
PLATELET # BLD AUTO: 208 K/UL — SIGNIFICANT CHANGE UP (ref 150–400)
POTASSIUM SERPL-MCNC: 4.2 MMOL/L — SIGNIFICANT CHANGE UP (ref 3.5–5.3)
POTASSIUM SERPL-SCNC: 4.2 MMOL/L — SIGNIFICANT CHANGE UP (ref 3.5–5.3)
PROTHROM AB SERPL-ACNC: 58.2 SEC — HIGH (ref 10–12.9)
RBC # BLD: 3.29 M/UL — LOW (ref 4.6–6.2)
RBC # FLD: 16.3 % — HIGH (ref 11–15.6)
SODIUM SERPL-SCNC: 141 MMOL/L — SIGNIFICANT CHANGE UP (ref 135–145)
WBC # BLD: 6.8 K/UL — SIGNIFICANT CHANGE UP (ref 4.8–10.8)
WBC # FLD AUTO: 6.8 K/UL — SIGNIFICANT CHANGE UP (ref 4.8–10.8)

## 2019-06-20 PROCEDURE — 97110 THERAPEUTIC EXERCISES: CPT

## 2019-06-20 PROCEDURE — 71045 X-RAY EXAM CHEST 1 VIEW: CPT

## 2019-06-20 PROCEDURE — 83036 HEMOGLOBIN GLYCOSYLATED A1C: CPT

## 2019-06-20 PROCEDURE — 99239 HOSP IP/OBS DSCHRG MGMT >30: CPT

## 2019-06-20 PROCEDURE — 87641 MR-STAPH DNA AMP PROBE: CPT

## 2019-06-20 PROCEDURE — 84550 ASSAY OF BLOOD/URIC ACID: CPT

## 2019-06-20 PROCEDURE — 83605 ASSAY OF LACTIC ACID: CPT

## 2019-06-20 PROCEDURE — 83540 ASSAY OF IRON: CPT

## 2019-06-20 PROCEDURE — 82565 ASSAY OF CREATININE: CPT

## 2019-06-20 PROCEDURE — 96365 THER/PROPH/DIAG IV INF INIT: CPT

## 2019-06-20 PROCEDURE — 93306 TTE W/DOPPLER COMPLETE: CPT

## 2019-06-20 PROCEDURE — 96375 TX/PRO/DX INJ NEW DRUG ADDON: CPT

## 2019-06-20 PROCEDURE — 76770 US EXAM ABDO BACK WALL COMP: CPT

## 2019-06-20 PROCEDURE — 81001 URINALYSIS AUTO W/SCOPE: CPT

## 2019-06-20 PROCEDURE — 87086 URINE CULTURE/COLONY COUNT: CPT

## 2019-06-20 PROCEDURE — 87640 STAPH A DNA AMP PROBE: CPT

## 2019-06-20 PROCEDURE — 84156 ASSAY OF PROTEIN URINE: CPT

## 2019-06-20 PROCEDURE — 85027 COMPLETE CBC AUTOMATED: CPT

## 2019-06-20 PROCEDURE — 99233 SBSQ HOSP IP/OBS HIGH 50: CPT

## 2019-06-20 PROCEDURE — 71250 CT THORAX DX C-: CPT

## 2019-06-20 PROCEDURE — 36415 COLL VENOUS BLD VENIPUNCTURE: CPT

## 2019-06-20 PROCEDURE — 80048 BASIC METABOLIC PNL TOTAL CA: CPT

## 2019-06-20 PROCEDURE — 87040 BLOOD CULTURE FOR BACTERIA: CPT

## 2019-06-20 PROCEDURE — 97116 GAIT TRAINING THERAPY: CPT

## 2019-06-20 PROCEDURE — 85730 THROMBOPLASTIN TIME PARTIAL: CPT

## 2019-06-20 PROCEDURE — 82570 ASSAY OF URINE CREATININE: CPT

## 2019-06-20 PROCEDURE — 93320 DOPPLER ECHO COMPLETE: CPT

## 2019-06-20 PROCEDURE — 84466 ASSAY OF TRANSFERRIN: CPT

## 2019-06-20 PROCEDURE — 82575 CREATININE CLEARANCE TEST: CPT

## 2019-06-20 PROCEDURE — 80053 COMPREHEN METABOLIC PANEL: CPT

## 2019-06-20 PROCEDURE — 82962 GLUCOSE BLOOD TEST: CPT

## 2019-06-20 PROCEDURE — 99285 EMERGENCY DEPT VISIT HI MDM: CPT | Mod: 25

## 2019-06-20 PROCEDURE — 85610 PROTHROMBIN TIME: CPT

## 2019-06-20 PROCEDURE — 74176 CT ABD & PELVIS W/O CONTRAST: CPT

## 2019-06-20 PROCEDURE — 83735 ASSAY OF MAGNESIUM: CPT

## 2019-06-20 PROCEDURE — 93312 ECHO TRANSESOPHAGEAL: CPT

## 2019-06-20 PROCEDURE — 97530 THERAPEUTIC ACTIVITIES: CPT

## 2019-06-20 PROCEDURE — 97163 PT EVAL HIGH COMPLEX 45 MIN: CPT

## 2019-06-20 PROCEDURE — 87150 DNA/RNA AMPLIFIED PROBE: CPT

## 2019-06-20 PROCEDURE — 87186 SC STD MICRODIL/AGAR DIL: CPT

## 2019-06-20 PROCEDURE — 93325 DOPPLER ECHO COLOR FLOW MAPG: CPT

## 2019-06-20 PROCEDURE — 80202 ASSAY OF VANCOMYCIN: CPT

## 2019-06-20 PROCEDURE — 99232 SBSQ HOSP IP/OBS MODERATE 35: CPT

## 2019-06-20 PROCEDURE — 82728 ASSAY OF FERRITIN: CPT

## 2019-06-20 PROCEDURE — 93005 ELECTROCARDIOGRAM TRACING: CPT

## 2019-06-20 PROCEDURE — 83550 IRON BINDING TEST: CPT

## 2019-06-20 RX ORDER — ACETAMINOPHEN 500 MG
2 TABLET ORAL
Qty: 0 | Refills: 0 | DISCHARGE
Start: 2019-06-20

## 2019-06-20 RX ORDER — AMPICILLIN TRIHYDRATE 250 MG
1 CAPSULE ORAL
Qty: 0 | Refills: 0 | DISCHARGE

## 2019-06-20 RX ORDER — PANTOPRAZOLE SODIUM 20 MG/1
1 TABLET, DELAYED RELEASE ORAL
Qty: 0 | Refills: 0 | DISCHARGE
Start: 2019-06-20

## 2019-06-20 RX ORDER — PREGABALIN 225 MG/1
1 CAPSULE ORAL
Qty: 0 | Refills: 0 | DISCHARGE

## 2019-06-20 RX ORDER — GABAPENTIN 400 MG/1
1 CAPSULE ORAL
Qty: 0 | Refills: 0 | DISCHARGE
Start: 2019-06-20

## 2019-06-20 RX ORDER — TRAMADOL HYDROCHLORIDE 50 MG/1
0.5 TABLET ORAL
Qty: 0 | Refills: 0 | DISCHARGE
Start: 2019-06-20

## 2019-06-20 RX ORDER — AMPICILLIN TRIHYDRATE 250 MG
2 CAPSULE ORAL ONCE
Refills: 0 | Status: COMPLETED | OUTPATIENT
Start: 2019-06-20 | End: 2019-06-20

## 2019-06-20 RX ORDER — ALLOPURINOL 300 MG
1 TABLET ORAL
Qty: 0 | Refills: 0 | DISCHARGE
Start: 2019-06-20

## 2019-06-20 RX ORDER — FUROSEMIDE 40 MG
1 TABLET ORAL
Qty: 0 | Refills: 0 | DISCHARGE

## 2019-06-20 RX ORDER — ASCORBIC ACID 60 MG
1 TABLET,CHEWABLE ORAL
Qty: 0 | Refills: 0 | DISCHARGE

## 2019-06-20 RX ORDER — ASCORBIC ACID 60 MG
0 TABLET,CHEWABLE ORAL
Qty: 0 | Refills: 0 | DISCHARGE

## 2019-06-20 RX ORDER — INSULIN GLARGINE 100 [IU]/ML
25 INJECTION, SOLUTION SUBCUTANEOUS
Qty: 0 | Refills: 0 | DISCHARGE
Start: 2019-06-20

## 2019-06-20 RX ORDER — AMPICILLIN TRIHYDRATE 250 MG
2 CAPSULE ORAL EVERY 6 HOURS
Refills: 0 | Status: DISCONTINUED | OUTPATIENT
Start: 2019-06-20 | End: 2019-06-20

## 2019-06-20 RX ORDER — AMPICILLIN TRIHYDRATE 250 MG
CAPSULE ORAL
Refills: 0 | Status: DISCONTINUED | OUTPATIENT
Start: 2019-06-20 | End: 2019-06-20

## 2019-06-20 RX ORDER — SODIUM CHLORIDE 9 MG/ML
10 INJECTION INTRAMUSCULAR; INTRAVENOUS; SUBCUTANEOUS
Refills: 0 | Status: DISCONTINUED | OUTPATIENT
Start: 2019-06-20 | End: 2019-06-20

## 2019-06-20 RX ORDER — AMPICILLIN TRIHYDRATE 250 MG
2 CAPSULE ORAL EVERY 8 HOURS
Refills: 0 | Status: DISCONTINUED | OUTPATIENT
Start: 2019-06-20 | End: 2019-06-20

## 2019-06-20 RX ORDER — INSULIN LISPRO 100/ML
8 VIAL (ML) SUBCUTANEOUS
Qty: 0 | Refills: 0 | DISCHARGE
Start: 2019-06-20

## 2019-06-20 RX ORDER — WARFARIN SODIUM 2.5 MG/1
1 TABLET ORAL
Qty: 0 | Refills: 0 | DISCHARGE

## 2019-06-20 RX ORDER — PREGABALIN 225 MG/1
0 CAPSULE ORAL
Qty: 0 | Refills: 0 | DISCHARGE

## 2019-06-20 RX ORDER — AMPICILLIN TRIHYDRATE 250 MG
2 CAPSULE ORAL
Qty: 0 | Refills: 0 | DISCHARGE

## 2019-06-20 RX ADMIN — PANTOPRAZOLE SODIUM 40 MILLIGRAM(S): 20 TABLET, DELAYED RELEASE ORAL at 06:32

## 2019-06-20 RX ADMIN — Medication 81 MILLIGRAM(S): at 11:35

## 2019-06-20 RX ADMIN — TRAMADOL HYDROCHLORIDE 25 MILLIGRAM(S): 50 TABLET ORAL at 11:35

## 2019-06-20 RX ADMIN — Medication 12: at 07:28

## 2019-06-20 RX ADMIN — Medication 40 MILLIGRAM(S): at 06:32

## 2019-06-20 RX ADMIN — GABAPENTIN 100 MILLIGRAM(S): 400 CAPSULE ORAL at 06:32

## 2019-06-20 RX ADMIN — Medication 7 UNIT(S): at 11:36

## 2019-06-20 RX ADMIN — Medication 100 MILLIGRAM(S): at 11:35

## 2019-06-20 RX ADMIN — AMPICILLIN SODIUM AND SULBACTAM SODIUM 200 GRAM(S): 250; 125 INJECTION, POWDER, FOR SUSPENSION INTRAMUSCULAR; INTRAVENOUS at 06:33

## 2019-06-20 RX ADMIN — Medication 7 UNIT(S): at 07:27

## 2019-06-20 RX ADMIN — Medication 216 GRAM(S): at 12:42

## 2019-06-20 RX ADMIN — INSULIN GLARGINE 10 UNIT(S): 100 INJECTION, SOLUTION SUBCUTANEOUS at 07:28

## 2019-06-20 RX ADMIN — Medication 200 MILLIGRAM(S): at 06:32

## 2019-06-20 RX ADMIN — Medication 6: at 11:36

## 2019-06-20 RX ADMIN — CHLORHEXIDINE GLUCONATE 1 APPLICATION(S): 213 SOLUTION TOPICAL at 12:42

## 2019-06-20 NOTE — PROGRESS NOTE ADULT - SUBJECTIVE AND OBJECTIVE BOX
Patient seen and examined    I&O's Summary    19 Jun 2019 07:01  -  20 Jun 2019 07:00  --------------------------------------------------------  IN: 0 mL / OUT: 400 mL / NET: -400 mL    20 Jun 2019 07:01  -  20 Jun 2019 14:47  --------------------------------------------------------  IN: 0 mL / OUT: 200 mL / NET: -200 mL    Feels better  Good appetite    REVIEW OF SYSTEMS:    CONSTITUTIONAL: No F/C  RESPIRATORY: No cough,  No SOB  CARDIOVASCULAR: No CP/palpitations,    GASTROINTESTINAL: No abdominal pain  or NVD   GENITOURINARY: No UTI sx  NEUROLOGICAL: No headaches, NO wk/numbness  MUSCULOSKELETAL:   EXTREMITIES : no swelling,    Vital Signs Last 24 Hrs  T(C): 36.9 (20 Jun 2019 08:18), Max: 36.9 (20 Jun 2019 08:18)  T(F): 98.4 (20 Jun 2019 08:18), Max: 98.4 (20 Jun 2019 08:18)  HR: 62 (20 Jun 2019 08:18) (62 - 76)  BP: 168/76 (20 Jun 2019 08:18) (148/70 - 168/76)  BP(mean): --  RR: 19 (20 Jun 2019 08:18) (18 - 19)  SpO2: 96% (20 Jun 2019 08:18) (96% - 97%)    PHYSICAL EXAM:    GENERAL: NAD,   EYES:  conjunctiva and sclera clear  NECK: Supple, No JVD/Bruit  NERVOUS SYSTEM:  A/O x3,   CHEST:  No rales or rhonchi  HEART:  gr 2 murmur  ABDOMEN: Soft, NT/ND BS+  EXTREMITIES:  No Edema;  SKIN: No rashes    LABS:                          9.3    6.8   )-----------( 208      ( 20 Jun 2019 08:27 )             28.6     06-20    141  |  101  |  66.0<H>  ----------------------------<  330<H>  4.2   |  22.0  |  2.47<H>    Ca    8.6      20 Jun 2019 08:27        MEDICATIONS  (STANDING):  acetaminophen   Tablet .. PRN  allopurinol  ampicillin  IVPB  aspirin enteric coated  atorvastatin  chlorhexidine 2% Cloths  dextrose 40% Gel PRN  dextrose 5%.  dextrose 50% Injectable  dextrose 50% Injectable  dextrose 50% Injectable  gabapentin  glucagon  Injectable PRN  insulin glargine Injectable (LANTUS)  insulin glargine Injectable (LANTUS)  insulin lispro (HumaLOG) corrective regimen sliding scale  insulin lispro Injectable (HumaLOG)  metoprolol succinate ER  pantoprazole    Tablet  predniSONE   Tablet  sodium chloride 0.9% lock flush PRN  traMADol PRN

## 2019-06-20 NOTE — DISCHARGE NOTE PROVIDER - HOSPITAL COURSE
2 year old male with PMH HTN, dyslipidemia, ESRD on HD, DM, CAD s/p CABG, CKD3 presented with dyspnea, fever and chills.     T101.2, WBC 12.6, Lact 2.2, SCr 1.98, INR 2.63 at admission            Enterococcus fecalis bacteremia. TTE without any vegetations CT abdomen with diarrhea in sigmoids as well as gastric thickening (which corresponds with patients history of GERD). Repeat culture  results negative. PICC placed 6/29/19.    - Vancomycin changed to Ampicillin as sensitive            Multifocal pneumonia with associated sepsis present upon admission. Now afebrile, normoxic.  CT chest with bilateral infiltrates RUL, RML and BRETT. Sepsis and pneumonia resolved.    - Continue antibiotics, change to Enterococcal coverage after ABELINO.                CKD4, with MENDY (likely ATN), likely secondary to sepsis, diuretic, diarrhea    - Avoid  nephrotoxin; Lasix discontinued by Nephrology earlier     - Monitor SCr        Acute gouty Arthritis - Right knee, improved    - Prednisone 40 mg PO daily        AF, rate controlled, INR therapeutic    - Continue BB    - Coumadin tonight        DMT2, A1c 9.1. Hyperglycemia uncontrolled worsening increase with Prednisone for gout    - Continue BGM and Sliding scale Insulins (increased to 3U multiples)    - Lantus - increased to 25 from 18 Units    - Premeal Insulins increased to7 from 5 Units    - Endocrinology consult (Add Lantus 10U qAM)        Diarrhea, likely antibiotic associated - resolved    - monitor BM, Lytes    - Continue Probiotics            GERD    - Continue PPI            Dyslipidemia    - Continue Statin        Prophylactic measure    - INR therapeutic for VTEP    - Florastor for C. diff, discontinue now that has PICC 2 year old male with PMH HTN, dyslipidemia, ESRD on HD, DM, CAD s/p CABG, CKD3 presented with dyspnea, fever and chills.     T101.2, WBC 12.6, Lact 2.2, SCr 1.98, INR 2.63 at admission. Admitted for pneumonia and placed on Vancomycin and Zosyn given Nursing Home resident.        Multifocal pneumonia with associated sepsis present upon admission. Now afebrile, normoxic.  CT chest with bilateral infiltrates RUL, RML and BRETT. Sepsis and pneumonia resolved.    Enterococcus fecalis bacteremia. TTE and ABELINO without any vegetations. CT abdomen with diarrhea in sigmoid as well as gastric thickening (which corresponds with patients history of GERD). Repeat culture results negative. PICC placed 6/29/19.Vancomycin changed to Ampicillin/Sulbactam as sensitive and now being changed to Ampicillin as received adequate duration for  pneumonia. To continue for 2 more weeks as per ID recommendations.            CKD4, with MENDY (likely ATN), likely secondary to sepsis, diuretic, diarrhea. Avoid  nephrotoxin; Lasix discontinued by Nephrology.  SCr now back to baseline.        Acute gouty Arthritis - Right knee, improved with Prednisone 40 mg PO daily, which is to be tapered.        AF, rate controlled, INR therapeutic. Continued on BB and Coumadin.        DMT2, A1c 9.1. Hyperglycemia uncontrolled worsening increase with Prednisone for gout. Insulins titrated according to BGM.        Diarrhea, likely antibiotic associated - resolved         GERD.  Continued on PPI        Dyslipidemia. Continue Statin                 Medically stable for discharge back to [facility]    Discharge time 45 minutes.

## 2019-06-20 NOTE — PROGRESS NOTE ADULT - PROVIDER SPECIALTY LIST ADULT
Cardiology
Cardiology
Hospitalist
Infectious Disease
Nephrology
Neurology
Palliative Care
Hospitalist
Infectious Disease
Infectious Disease
Nephrology
Nephrology
Cardiology

## 2019-06-20 NOTE — DISCHARGE NOTE NURSING/CASE MANAGEMENT/SOCIAL WORK - NSDCDPATPORTLINK_GEN_ALL_CORE
You can access the [facility] Patient Portal, offered by [facility], by registering with the following website: [email or web address]

## 2019-06-20 NOTE — PROGRESS NOTE ADULT - ASSESSMENT
78 y/o male with h/o A Fib on coumadin CHF, Gout and arthritis who was admitted to [facility] about 9 days ago for incapacitation and unable to ambulate. patient developed SOB and chills this morning. no cough. no headache nausea or vomiting. no diarrhea or dysuria but frequent urination. In the ER, Temp: 101.2. O2 was 87, improved with O2 NC . CXR showed right upper and lower pneumonia.   PT WITH BLOOD CX ENTEROCOCCAL BACTEREMIA   on  AMPICILLIN   ECHO NEGATIVE BUT     ABELINO    neg     POSSIBLE PNEUMONIA ON     UNASYN    PICC DOUGLAS PLACED WOULD TREAT   FOR 2   WEEKS THROUGH JULY 4  WILL FOLLOW UP  CAN CHANGE TO AMPICLLIN  2GMS Q 6  AS NEEDED PLEASE TEMITOPE IF QUESTIONS 78 y/o male with h/o A Fib on coumadin CHF, Gout and arthritis who was admitted to [facility] about 9 days ago for incapacitation and unable to ambulate. patient developed SOB and chills this morning. no cough. no headache nausea or vomiting. no diarrhea or dysuria but frequent urination. In the ER, Temp: 101.2. O2 was 87, improved with O2 NC . CXR showed right upper and lower pneumonia.   PT WITH BLOOD CX ENTEROCOCCAL BACTEREMIA   on  AMPICILLIN   ECHO NEGATIVE BUT     ABELINO    neg     POSSIBLE PNEUMONIA ON     UNASYN    PICC DOUGLAS PLACED WOULD TREAT   FOR 2   WEEKS THROUGH JULY 4  WILL FOLLOW UP  CAN CHANGE TO AMPICLLIN  2GMS Q 8  AS NEEDED PLEASE TEMITOPE IF QUESTIONS

## 2019-06-20 NOTE — CHART NOTE - NSCHARTNOTEFT_GEN_A_CORE
Pt seen and examined at bedside. He is pending discharge to [facility] this afternoon. He reports still with Rt knee pain; improvement with Tramadol. Pt indicated speaking with granddaughter/HCP Erin regarding advance directives and no further decisions at this time including MOLST. I encouraged him to have ongoing open discussions with his family on advance care planning including directives. No further recommendations. Will sign off.

## 2019-06-20 NOTE — PROGRESS NOTE ADULT - SUBJECTIVE AND OBJECTIVE BOX
[facility]  INFECTIOUS DISEASES AND INTERNAL MEDICINE at [place]  =======================================================  Francisco Gomez MD  Diplomates American Board of Internal Medicine and Infectious Diseases  =======================================================    TAI IFEANYI [id number]    Follow up: enterococcal bacteremia    Allergies:  No Known Allergies      Medications:  acetaminophen   Tablet .. 650 milliGRAM(s) Oral every 6 hours PRN  allopurinol 100 milliGRAM(s) Oral daily  ampicillin  IVPB      ampicillin  IVPB 2 Gram(s) IV Intermittent every 8 hours  aspirin enteric coated 81 milliGRAM(s) Oral daily  atorvastatin 40 milliGRAM(s) Oral at bedtime  dextrose 40% Gel 15 Gram(s) Oral once PRN  dextrose 5%. 1000 milliLiter(s) IV Continuous <Continuous>  dextrose 50% Injectable 12.5 Gram(s) IV Push once  dextrose 50% Injectable 25 Gram(s) IV Push once  dextrose 50% Injectable 25 Gram(s) IV Push once  furosemide    Tablet 40 milliGRAM(s) Oral daily  gabapentin 100 milliGRAM(s) Oral three times a day  glucagon  Injectable 1 milliGRAM(s) IntraMuscular once PRN  insulin glargine Injectable (LANTUS) 15 Unit(s) SubCutaneous at bedtime  insulin lispro (HumaLOG) corrective regimen sliding scale   SubCutaneous three times a day before meals  insulin lispro (HumaLOG) corrective regimen sliding scale   SubCutaneous at bedtime  metoprolol succinate  milliGRAM(s) Oral daily  pantoprazole    Tablet 40 milliGRAM(s) Oral before breakfast  saccharomyces boulardii 250 milliGRAM(s) Oral two times a day  traMADol 25 milliGRAM(s) Oral four times a day PRN  warfarin 5 milliGRAM(s) Oral once    SOCIAL       FAMILY   FAMILY HISTORY:    REVIEW OF SYSTEMS:  CONSTITUTIONAL:  No Fever or chills  HEENT:   No diplopia or blurred vision.  No earache, sore throat or runny nose.  CARDIOVASCULAR:  No pressure, squeezing, strangling, tightness, heaviness or aching about the chest, neck, axilla or epigastrium.  RESPIRATORY:  No cough, shortness of breath, PND or orthopnea.  GASTROINTESTINAL:  No nausea, vomiting or diarrhea.  GENITOURINARY:  No dysuria, frequency or urgency. No Blood in urine  MUSCULOSKELETAL:   AS PER HPI  SKIN:  No change in skin, hair or nails.  NEUROLOGIC:  No paresthesias, fasciculations, seizures or weakness.  PSYCHIATRIC:  No disorder of thought or mood.  ENDOCRINE:  No heat or cold intolerance, polyuria or polydipsia.  HEMATOLOGICAL:  No easy bruising or bleeding.            Physical Exam:      Vital Signs Last 24 Hrs  T(C): 36.9 (20 Jun 2019 08:18), Max: 36.9 (20 Jun 2019 08:18)  T(F): 98.4 (20 Jun 2019 08:18), Max: 98.4 (20 Jun 2019 08:18)  HR: 62 (20 Jun 2019 08:18) (62 - 76)  BP: 168/76 (20 Jun 2019 08:18) (148/70 - 168/76)  BP(mean): --  RR: 19 (20 Jun 2019 08:18) (18 - 19)  SpO2: 96% (20 Jun 2019 08:18) (96% - 97%))      GEN: NAD, pleasant  HEENT: normocephalic and atraumatic. EOMI. JOSE.    NECK: Supple. No carotid bruits.  No lymphadenopathy or thyromegaly.  LUNGS: Clear to auscultation.  HEART: Regular rate and rhythm without murmur.  ABDOMEN: Soft, nontender, and nondistended.  Positive bowel sounds.    : No CVA tenderness  EXTREMITIES: Without any cyanosis, clubbing, rash, lesions or edema.  MSK: no joint swelling  NEUROLOGIC: Cranial nerves II through XII are grossly intact.  PSYCHIATRIC: Appropriate affect .  SKIN: No ulceration or induration present.        Labs:                          9.3    6.8   )-----------( 208      ( 20 Jun 2019 08:27 )             28.6   06-20    141  |  101  |  66.0<H>  ----------------------------<  330<H>  4.2   |  22.0  |  2.47<H>    Ca    8.6      20 Jun 2019 08:27              RECENT CULTURES:  06-12 @ 09:23 .Blood Enterococcus faecalis  Blood Culture PCR    Growth in aerobic and anaerobic bottles: Enterococcus faecalis  Aerobic Bottle: 11.56 Hours to positivity  Anaerobic Bottle: 13:26 Hours to positivity  .  ***Blood Panel PCR results on this specimen are available  approximately 3 hours after the Gram stain result.***  Gram stain, PCR, and/or culture results may not always  correspond due to difference in methodologies.  ************************************************************  This PCR assay was performed using [employer].  The following targets are tested for: Enterococcus,  vancomycin resistant enterococci, Listeria monocytogenes,  coagulase negative staphylococci, S. aureus,  methicillin resistant S. aureus, Streptococcus agalactiae  (Group B), S. pneumoniae, S. pyogenes (Group A),  Acinetobacter baumannii, Enterobacter cloacae, E. coli,  Klebsiella oxytoca, K. pneumoniae, Proteus sp.,  Serratia marcescens, Haemophilus influenzae,  Neisseria meningitidis, Pseudomonas aeruginosa, Candida  albicans, C. glabrata, C krusei, C parapsilosis,  C. tropicalis and the KPC resistance gene.  "Due to technical problems, Proteus sp. will Not be reported as part of  the BCID panel until further notice"  .  TYPE: (C=Critical, N=Notification, A=Abnormal) C  TESTS:  _ GS BLD  DATE/TIME CALLED: _ 06/12/2019 22:53:24  CALLED TO: Maria Dolores RANGEL  READ BACK (2 Patient Identifiers)(Y/N): _ Y  READ BACK VALUES (Y/N): _ Y  CALLED BY: Maria Dolores BACK        06-12 @ 09:22 .Blood Enterococcus faecalis    Growth in aerobic and anaerobic bottles: Enterococcus faecalis  Aerobic Bottle: 12:26 Hours to positivity  Anaerobic Bottle: 12:36 Hours to positivity  .  TYPE: (C=Critical, N=Notification, A=Abnormal) C  TESTS:  _ Positive Blood GS  DATE/TIME CALLED: _ 06/13/2019 09:50  CALLED TO: _ 4TWR: Maude Matias RN  READ BACK (2 Patient Identifiers)(Y/N): _ Y  READ BACK VALUES (Y/N): _ Y  CALLED BY: Maria Dolores patricia        06-12 @ 09:20 .Urine     No growth

## 2019-06-20 NOTE — DISCHARGE NOTE PROVIDER - CARE PROVIDER_API CALL
Mathieu Cage)  Internal Medicine  [address] [place] [address]  Phone: [phone]  Fax: [phone]  Follow Up Time: 1-3 days

## 2019-06-20 NOTE — PROGRESS NOTE ADULT - ASSESSMENT
CKD(IV): Creat 1.98 - 2.47 range  MENDY: vol depletion from diuretics==> resolving off diuretics  Hyponatremia resolved  PNA with enteroccal bacteremia  -  would cont to hold diuretics for now  - ABELINO / ECHO -ve  Has PICC line - on Unasyn per ID  Watch creatinine - being transferred to rehab

## 2019-06-20 NOTE — DISCHARGE NOTE PROVIDER - NSDCCPCAREPLAN_GEN_ALL_CORE_FT
PRINCIPAL DISCHARGE DIAGNOSIS  Diagnosis: Multifocal pneumonia  Assessment and Plan of Treatment: Resolved      SECONDARY DISCHARGE DIAGNOSES  Diagnosis: Sepsis  Assessment and Plan of Treatment: Resolved    Diagnosis: Enterococcal bacteremia  Assessment and Plan of Treatment: Continue IV antibiotics x 2 more weeks    Diagnosis: Acute gouty arthritis  Assessment and Plan of Treatment: Continue Prednisone    Diagnosis: MENDY (acute kidney injury)  Assessment and Plan of Treatment: Resolved    Diagnosis: CKD (chronic kidney disease) stage 4, GFR 15-29 ml/min  Assessment and Plan of Treatment: Follow up with Nephrology    Diagnosis: Atrial fibrillation, chronic  Assessment and Plan of Treatment: Continue home medications    Diagnosis: Diarrhea  Assessment and Plan of Treatment: Resolved    Diagnosis: Dyslipidemia  Assessment and Plan of Treatment: Continue diet and medications as prescribed    Diagnosis: Chronic GERD  Assessment and Plan of Treatment: Continue home medications    Diagnosis: Valvular heart disease  Assessment and Plan of Treatment: Monitor for fluid overload - resume Lasix as needed    Diagnosis: Essential hypertension  Assessment and Plan of Treatment: Continue diet and medications as prescribed

## 2019-06-20 NOTE — DISCHARGE NOTE PROVIDER - NSDCPNSUBOBJ_GEN_ALL_CORE
HOSPITALIST PROGRESS NOTE        IFEANYI LUJAN    [id number]    79yMale        Patient is a 79y old  Male who presents with a chief complaint of SOB (2019 11:13)            SUBJECTIVE:     Chart reviewed since last visit.    Patient seen and examined at bedside.            OBJECTIVE:    Vital Signs Last 24 Hrs    T(C): 36.9 (2019 08:18), Max: 36.9 (2019 08:18)    T(F): 98.4 (2019 08:18), Max: 98.4 (2019 08:18)    HR: 62 (2019 08:18) (62 - 76)    BP: 168/76 (2019 08:18) (148/70 - 168/76)     RR: 19 (2019 08:18) (18 - 19)    SpO2: 96% (2019 08:18) (96% - 97%)        PHYSICAL EXAMINATION    General: Lying in bed, NAD    HEENT:  extraocular movements intact, moist oral mucosa    NECK:  Supple    CVS: regular rate and rhythm     RESP:  Clear to auscultation    GI:  Soft nondistended nontender BS+    : No suprapubic tenderness    MSK: Improved ROM. PICC+RUE    CNS:  Non focal exam    INTEG:  Warm dry skin    PSYCH:  Fair mood        MONITOR:    CAPILLARY BLOOD GLUCOSE            POCT Blood Glucose.: 242 mg/dL (2019 11:27)    POCT Blood Glucose.: 338 mg/dL (2019 07:27)    POCT Blood Glucose.: 368 mg/dL (2019 22:28)    POCT Blood Glucose.: 291 mg/dL (2019 17:38)    POCT Blood Glucose.: 145 mg/dL (2019 13:54)                I&O's Summary        2019 07:01  -  2019 07:00    --------------------------------------------------------    IN: 0 mL / OUT: 400 mL / NET: -400 mL                                    9.3      6.8   )-----------( 208      ( 2019 08:27 )               28.6         PT/INR - ( 2019 08:27 )   PT: 58.2 sec;   INR: 4.82 ratio               PTT - ( 2019 08:27 )  PTT:43.1 sec    -20        141  |  101  |  66.0<H>    ----------------------------<  330<H>    4.2   |  22.0  |  2.47<H>        Ca    8.6      2019 08:27                    Urinalysis Basic - ( 2019 07:32 )        Color: Yellow / Appearance: Clear / S.015 / pH: x    Gluc: x / Ketone: Negative  / Bili: Negative / Urobili: Negative mg/dL     Blood: x / Protein: 15 mg/dL / Nitrite: Negative     Leuk Esterase: Negative / RBC: Negative /HPF / WBC 0-2     Sq Epi: x / Non Sq Epi: Few / Bacteria: Few                Culture:        TTE:        RADIOLOGY                MEDICATIONS  (STANDING):    allopurinol 100 milliGRAM(s) Oral daily    ampicillin  IVPB        ampicillin  IVPB 2 Gram(s) IV Intermittent once    ampicillin  IVPB 2 Gram(s) IV Intermittent every 6 hours    aspirin enteric coated 81 milliGRAM(s) Oral daily    atorvastatin 40 milliGRAM(s) Oral at bedtime    chlorhexidine 2% Cloths 1 Application(s) Topical daily    dextrose 5%. 1000 milliLiter(s) (50 mL/Hr) IV Continuous <Continuous>    dextrose 50% Injectable 12.5 Gram(s) IV Push once    dextrose 50% Injectable 25 Gram(s) IV Push once    dextrose 50% Injectable 25 Gram(s) IV Push once    gabapentin 100 milliGRAM(s) Oral three times a day    insulin glargine Injectable (LANTUS) 25 Unit(s) SubCutaneous at bedtime    insulin glargine Injectable (LANTUS) 10 Unit(s) SubCutaneous every morning    insulin lispro (HumaLOG) corrective regimen sliding scale   SubCutaneous three times a day before meals    insulin lispro Injectable (HumaLOG) 7 Unit(s) SubCutaneous three times a day before meals    metoprolol succinate  milliGRAM(s) Oral daily    pantoprazole    Tablet 40 milliGRAM(s) Oral before breakfast    predniSONE   Tablet 40 milliGRAM(s) Oral daily            MEDICATIONS  (PRN):    acetaminophen   Tablet .. 650 milliGRAM(s) Oral every 6 hours PRN Temp greater or equal to 38C (100.4F), Mild Pain (1 - 3), Moderate Pain (4 - 6)    dextrose 40% Gel 15 Gram(s) Oral once PRN Blood Glucose LESS THAN 70 milliGRAM(s)/deciliter    glucagon  Injectable 1 milliGRAM(s) IntraMuscular once PRN Glucose LESS THAN 70 milligrams/deciliter    sodium chloride 0.9% lock flush 10 milliLiter(s) IV Push every 1 hour PRN Pre/post blood products, medications, blood draw, and to maintain line patency    traMADol 25 milliGRAM(s) Oral four times a day PRN Moderate Pain (4 - 6) HOSPITALIST PROGRESS NOTE        IFEANYI LUJAN    [id number]    79yMale        Patient is a 79y old  Male who presents with a chief complaint of SOB (2019 11:13)            SUBJECTIVE:     Chart reviewed since last visit.    Patient seen and examined at bedside. for enterococcal bacteremia    Denies any fever or chills.        T(C): 36.9 (2019 08:18), Max: 36.9 (2019 08:18)    T(F): 98.4 (2019 08:18), Max: 98.4 (2019 08:18)    HR: 62 (2019 08:18) (62 - 76)    BP: 168/76 (2019 08:18) (148/70 - 168/76)     RR: 19 (2019 08:18) (18 - 19)    SpO2: 96% (2019 08:18) (96% - 97%)        PHYSICAL EXAMINATION    General: Lying in bed, NAD    HEENT:  extraocular movements intact, moist oral mucosa    NECK:  Supple    CVS: regular rate and rhythm     RESP:  Clear to auscultation    GI:  Soft nondistended nontender BS+    : No suprapubic tenderness    MSK: Improved ROM. PICC+RUE    CNS:  Non focal exam    INTEG:  Warm dry skin    PSYCH:  Fair mood        MONITOR:    CAPILLARY BLOOD GLUCOSE            POCT Blood Glucose.: 242 mg/dL (2019 11:27)    POCT Blood Glucose.: 338 mg/dL (2019 07:27)    POCT Blood Glucose.: 368 mg/dL (2019 22:28)    POCT Blood Glucose.: 291 mg/dL (2019 17:38)    POCT Blood Glucose.: 145 mg/dL (2019 13:54)                I&O's Summary        2019 07:01  -  2019 07:00    --------------------------------------------------------    IN: 0 mL / OUT: 400 mL / NET: -400 mL                                    9.3      6.8   )-----------( 208      ( 2019 08:27 )               28.6         PT/INR - ( 2019 08:27 )   PT: 58.2 sec;   INR: 4.82 ratio               PTT - ( 2019 08:27 )  PTT:43.1 sec    06-20        141  |  101  |  66.0<H>    ----------------------------<  330<H>    4.2   |  22.0  |  2.47<H>        Ca    8.6      2019 08:27                    Urinalysis Basic - ( 2019 07:32 )        Color: Yellow / Appearance: Clear / S.015 / pH: x    Gluc: x / Ketone: Negative  / Bili: Negative / Urobili: Negative mg/dL     Blood: x / Protein: 15 mg/dL / Nitrite: Negative     Leuk Esterase: Negative / RBC: Negative /HPF / WBC 0-2     Sq Epi: x / Non Sq Epi: Few / Bacteria: Few                Culture:        TTE:        RADIOLOGY                MEDICATIONS  (STANDING):    allopurinol 100 milliGRAM(s) Oral daily    ampicillin  IVPB        ampicillin  IVPB 2 Gram(s) IV Intermittent once    ampicillin  IVPB 2 Gram(s) IV Intermittent every 6 hours    aspirin enteric coated 81 milliGRAM(s) Oral daily    atorvastatin 40 milliGRAM(s) Oral at bedtime    chlorhexidine 2% Cloths 1 Application(s) Topical daily    dextrose 5%. 1000 milliLiter(s) (50 mL/Hr) IV Continuous <Continuous>    dextrose 50% Injectable 12.5 Gram(s) IV Push once    dextrose 50% Injectable 25 Gram(s) IV Push once    dextrose 50% Injectable 25 Gram(s) IV Push once    gabapentin 100 milliGRAM(s) Oral three times a day    insulin glargine Injectable (LANTUS) 25 Unit(s) SubCutaneous at bedtime    insulin glargine Injectable (LANTUS) 10 Unit(s) SubCutaneous every morning    insulin lispro (HumaLOG) corrective regimen sliding scale   SubCutaneous three times a day before meals    insulin lispro Injectable (HumaLOG) 7 Unit(s) SubCutaneous three times a day before meals    metoprolol succinate  milliGRAM(s) Oral daily    pantoprazole    Tablet 40 milliGRAM(s) Oral before breakfast    predniSONE   Tablet 40 milliGRAM(s) Oral daily            MEDICATIONS  (PRN):    acetaminophen   Tablet .. 650 milliGRAM(s) Oral every 6 hours PRN Temp greater or equal to 38C (100.4F), Mild Pain (1 - 3), Moderate Pain (4 - 6)    dextrose 40% Gel 15 Gram(s) Oral once PRN Blood Glucose LESS THAN 70 milliGRAM(s)/deciliter    glucagon  Injectable 1 milliGRAM(s) IntraMuscular once PRN Glucose LESS THAN 70 milligrams/deciliter    sodium chloride 0.9% lock flush 10 milliLiter(s) IV Push every 1 hour PRN Pre/post blood products, medications, blood draw, and to maintain line patency    traMADol 25 milliGRAM(s) Oral four times a day PRN Moderate Pain (4 - 6)

## 2019-06-24 ENCOUNTER — APPOINTMENT (OUTPATIENT)
Age: 79
End: 2019-06-24

## 2019-06-24 DIAGNOSIS — G62.9 POLYNEUROPATHY, UNSPECIFIED: ICD-10-CM

## 2019-06-24 DIAGNOSIS — Z79.01 LONG TERM (CURRENT) USE OF ANTICOAGULANTS: ICD-10-CM

## 2019-06-24 DIAGNOSIS — E11.29 TYPE 2 DIABETES MELLITUS WITH OTHER DIABETIC KIDNEY COMPLICATION: ICD-10-CM

## 2019-06-24 DIAGNOSIS — R06.02 SHORTNESS OF BREATH: ICD-10-CM

## 2019-06-24 DIAGNOSIS — M10.9 GOUT, UNSPECIFIED: ICD-10-CM

## 2019-06-24 DIAGNOSIS — N18.4 HYPERTENSIVE CHRONIC KIDNEY DISEASE WITH STAGE 1 THROUGH STAGE 4 CHRONIC KIDNEY DISEASE, OR UNSPECIFIED CHRONIC KIDNEY DISEASE: ICD-10-CM

## 2019-06-24 DIAGNOSIS — N18.6 END STAGE RENAL DISEASE: ICD-10-CM

## 2019-06-24 DIAGNOSIS — I12.9 HYPERTENSIVE CHRONIC KIDNEY DISEASE WITH STAGE 1 THROUGH STAGE 4 CHRONIC KIDNEY DISEASE, OR UNSPECIFIED CHRONIC KIDNEY DISEASE: ICD-10-CM

## 2019-06-24 DIAGNOSIS — Z99.2 END STAGE RENAL DISEASE: ICD-10-CM

## 2019-06-24 PROBLEM — E11.9 TYPE 2 DIABETES MELLITUS WITHOUT COMPLICATIONS: Chronic | Status: ACTIVE | Noted: 2019-06-12

## 2019-06-24 PROBLEM — I50.9 HEART FAILURE, UNSPECIFIED: Chronic | Status: ACTIVE | Noted: 2019-06-12

## 2019-06-24 PROBLEM — I48.91 UNSPECIFIED ATRIAL FIBRILLATION: Chronic | Status: ACTIVE | Noted: 2019-06-12

## 2019-06-24 PROBLEM — N18.9 CHRONIC KIDNEY DISEASE, UNSPECIFIED: Chronic | Status: ACTIVE | Noted: 2019-06-12

## 2019-06-25 PROBLEM — G62.9 NEUROPATHY: Status: ACTIVE | Noted: 2019-06-25

## 2019-06-25 PROBLEM — M10.9 GOUTY ARTHRITIS: Status: ACTIVE | Noted: 2019-06-25

## 2019-06-25 PROBLEM — Z79.01 ANTICOAGULATED ON COUMADIN: Status: ACTIVE | Noted: 2019-06-25

## 2019-06-25 PROBLEM — R06.02 SOB (SHORTNESS OF BREATH) ON EXERTION: Status: ACTIVE | Noted: 2019-06-25

## 2019-06-25 PROBLEM — I12.9 CKD STAGE 4 SECONDARY TO HYPERTENSION: Status: ACTIVE | Noted: 2019-06-25

## 2019-06-25 PROBLEM — N18.6 ESRD ON HEMODIALYSIS: Status: ACTIVE | Noted: 2019-06-25

## 2019-06-25 PROBLEM — E11.29 TYPE 2 DIABETES MELLITUS WITH OTHER KIDNEY COMPLICATION: Status: ACTIVE | Noted: 2019-06-25

## 2019-06-25 RX ORDER — PANTOPRAZOLE SODIUM 40 MG/1
40 TABLET, DELAYED RELEASE ORAL DAILY
Qty: 30 | Refills: 0 | Status: ACTIVE | COMMUNITY
Start: 2019-06-25

## 2019-06-25 RX ORDER — IPRATROPIUM BROMIDE AND ALBUTEROL SULFATE 2.5; .5 MG/3ML; MG/3ML
0.5-2.5 (3) SOLUTION RESPIRATORY (INHALATION) 4 TIMES DAILY
Qty: 1 | Refills: 3 | Status: ACTIVE | COMMUNITY
Start: 2019-06-25

## 2019-06-25 RX ORDER — INSULIN GLARGINE 100 [IU]/ML
100 INJECTION, SOLUTION SUBCUTANEOUS
Qty: 15 | Refills: 0 | Status: DISCONTINUED | COMMUNITY
Start: 2018-12-18 | End: 2019-06-25

## 2019-06-25 RX ORDER — MAGNESIUM OXIDE 400 MG
400 (241.3 MG) TABLET ORAL DAILY
Qty: 40 | Refills: 0 | Status: DISCONTINUED | COMMUNITY
Start: 2017-06-21 | End: 2019-06-25

## 2019-06-25 RX ORDER — METOPROLOL SUCCINATE 200 MG/1
200 TABLET, EXTENDED RELEASE ORAL DAILY
Refills: 0 | Status: ACTIVE | COMMUNITY
Start: 2019-06-25

## 2019-06-25 RX ORDER — INSULIN GLARGINE 100 [IU]/ML
100 INJECTION, SOLUTION SUBCUTANEOUS
Refills: 0 | Status: ACTIVE | COMMUNITY
Start: 2019-06-25

## 2019-06-25 RX ORDER — TRAMADOL HYDROCHLORIDE 50 MG/1
50 TABLET, COATED ORAL
Qty: 21 | Refills: 0 | Status: DISCONTINUED | COMMUNITY
Start: 2019-02-06 | End: 2019-06-25

## 2019-06-25 RX ORDER — PEN NEEDLE, DIABETIC 29 G X1/2"
31G X 5 MM NEEDLE, DISPOSABLE MISCELLANEOUS
Qty: 100 | Refills: 0 | Status: DISCONTINUED | COMMUNITY
Start: 2018-05-14 | End: 2019-06-25

## 2019-06-25 RX ORDER — SITAGLIPTIN 100 MG/1
100 TABLET, FILM COATED ORAL DAILY
Refills: 0 | Status: ACTIVE | COMMUNITY
Start: 2019-06-25

## 2019-06-25 RX ORDER — SITAGLIPTIN 50 MG/1
50 TABLET, FILM COATED ORAL
Qty: 90 | Refills: 0 | Status: DISCONTINUED | COMMUNITY
Start: 2018-11-19 | End: 2019-06-25

## 2019-06-25 RX ORDER — GABAPENTIN 100 MG/1
100 CAPSULE ORAL 3 TIMES DAILY
Refills: 0 | Status: ACTIVE | COMMUNITY
Start: 2019-06-25

## 2019-06-25 RX ORDER — RANITIDINE 150 MG/1
150 TABLET ORAL TWICE DAILY
Refills: 0 | Status: DISCONTINUED | COMMUNITY
End: 2019-06-25

## 2019-06-25 RX ORDER — RANITIDINE 150 MG/1
150 TABLET ORAL
Refills: 0 | Status: ACTIVE | COMMUNITY
Start: 2019-06-25

## 2019-06-25 RX ORDER — POTASSIUM CHLORIDE 750 MG/1
10 TABLET, EXTENDED RELEASE ORAL
Refills: 0 | Status: DISCONTINUED | COMMUNITY
End: 2019-06-25

## 2019-06-25 RX ORDER — EMPAGLIFLOZIN 10 MG/1
10 TABLET, FILM COATED ORAL DAILY
Refills: 0 | Status: DISCONTINUED | COMMUNITY
End: 2019-06-25

## 2019-06-25 RX ORDER — TRAMADOL HYDROCHLORIDE 50 MG/1
50 TABLET, COATED ORAL EVERY 6 HOURS
Refills: 0 | Status: ACTIVE | COMMUNITY
Start: 2019-06-25

## 2019-06-25 RX ORDER — WARFARIN SODIUM 6 MG/1
6 TABLET ORAL DAILY
Refills: 0 | Status: ACTIVE | COMMUNITY
Start: 2019-06-25

## 2020-03-03 NOTE — H&P ADULT - NEGATIVE GENERAL SYMPTOMS
"Chief Complaint   Patient presents with     URI       Initial /72   Pulse 72   Temp 97.7  F (36.5  C)   Ht 1.55 m (5' 1.02\")   Wt 53.5 kg (118 lb)   SpO2 93%   BMI 22.28 kg/m   Estimated body mass index is 22.28 kg/m  as calculated from the following:    Height as of this encounter: 1.55 m (5' 1.02\").    Weight as of this encounter: 53.5 kg (118 lb).  Medication Reconciliation: complete  Niharika Ignacio LPN    " no fever

## 2022-09-26 NOTE — PROCEDURE NOTE - NSNUMATTEMPT_GEN_A_CORE
1 Qbrexza Pregnancy And Lactation Text: There is no available data on Qbrexza use in pregnant women.  There is no available data on Qbrexza use in lactation.

## 2023-03-14 NOTE — H&P ADULT - LV FUNCTION ASSESSMENT
unknown What Type Of Note Output Would You Prefer (Optional)?: Standard Output How Severe Is Your Acne?: moderate Is This A New Presentation, Or A Follow-Up?: Acne

## 2024-10-04 NOTE — ED ADULT TRIAGE NOTE - HEART RATE (BEATS/MIN)
DISPLAY PLAN FREE TEXT DISPLAY PLAN FREE TEXT DISPLAY PLAN FREE TEXT DISPLAY PLAN FREE TEXT DISPLAY PLAN FREE TEXT DISPLAY PLAN FREE TEXT DISPLAY PLAN FREE TEXT DISPLAY PLAN FREE TEXT DISPLAY PLAN FREE TEXT DISPLAY PLAN FREE TEXT 86 DISPLAY PLAN FREE TEXT